# Patient Record
Sex: MALE | Race: WHITE | NOT HISPANIC OR LATINO | Employment: FULL TIME | URBAN - NONMETROPOLITAN AREA
[De-identification: names, ages, dates, MRNs, and addresses within clinical notes are randomized per-mention and may not be internally consistent; named-entity substitution may affect disease eponyms.]

---

## 2019-05-15 ENCOUNTER — OFFICE VISIT (OUTPATIENT)
Dept: CARDIOLOGY | Facility: CLINIC | Age: 59
End: 2019-05-15

## 2019-05-15 VITALS
SYSTOLIC BLOOD PRESSURE: 123 MMHG | OXYGEN SATURATION: 98 % | WEIGHT: 204 LBS | DIASTOLIC BLOOD PRESSURE: 71 MMHG | HEIGHT: 71 IN | BODY MASS INDEX: 28.56 KG/M2 | RESPIRATION RATE: 16 BRPM | HEART RATE: 86 BPM

## 2019-05-15 DIAGNOSIS — R06.02 SHORTNESS OF BREATH: Primary | ICD-10-CM

## 2019-05-15 DIAGNOSIS — R07.9 CHEST PAIN, UNSPECIFIED TYPE: ICD-10-CM

## 2019-05-15 DIAGNOSIS — I65.29 STENOSIS OF CAROTID ARTERY, UNSPECIFIED LATERALITY: ICD-10-CM

## 2019-05-15 DIAGNOSIS — R09.89 BRUIT OF RIGHT CAROTID ARTERY: ICD-10-CM

## 2019-05-15 PROCEDURE — 93000 ELECTROCARDIOGRAM COMPLETE: CPT | Performed by: PHYSICIAN ASSISTANT

## 2019-05-15 PROCEDURE — 99204 OFFICE O/P NEW MOD 45 MIN: CPT | Performed by: PHYSICIAN ASSISTANT

## 2019-05-15 RX ORDER — LOSARTAN POTASSIUM 100 MG/1
100 TABLET ORAL DAILY
COMMUNITY

## 2019-05-15 RX ORDER — ATORVASTATIN CALCIUM 40 MG/1
40 TABLET, FILM COATED ORAL NIGHTLY
Qty: 30 TABLET | Refills: 6 | Status: SHIPPED | OUTPATIENT
Start: 2019-05-15 | End: 2021-02-26 | Stop reason: ALTCHOICE

## 2019-05-15 RX ORDER — ASPIRIN 81 MG/1
81 TABLET ORAL DAILY
COMMUNITY

## 2019-05-15 RX ORDER — PRASUGREL 10 MG/1
10 TABLET, FILM COATED ORAL DAILY
COMMUNITY
End: 2019-05-15

## 2019-05-15 RX ORDER — AMITRIPTYLINE HYDROCHLORIDE 25 MG/1
25 TABLET, FILM COATED ORAL NIGHTLY
COMMUNITY

## 2019-05-15 RX ORDER — CARVEDILOL 25 MG/1
25 TABLET ORAL 2 TIMES DAILY WITH MEALS
COMMUNITY
End: 2021-08-27 | Stop reason: SDUPTHER

## 2019-05-15 RX ORDER — GLIMEPIRIDE 4 MG/1
4 TABLET ORAL 2 TIMES DAILY
COMMUNITY

## 2019-05-15 RX ORDER — ATORVASTATIN CALCIUM 80 MG/1
80 TABLET, FILM COATED ORAL NIGHTLY
COMMUNITY
End: 2019-05-15 | Stop reason: SDUPTHER

## 2019-05-15 NOTE — PATIENT INSTRUCTIONS
Steps to Quit Smoking  Smoking tobacco can be harmful to your health and can affect almost every organ in your body. Smoking puts you, and those around you, at risk for developing many serious chronic diseases. Quitting smoking is difficult, but it is one of the best things that you can do for your health. It is never too late to quit.  What are the benefits of quitting smoking?  When you quit smoking, you lower your risk of developing serious diseases and conditions, such as:  · Lung cancer or lung disease, such as COPD.  · Heart disease.  · Stroke.  · Heart attack.  · Infertility.  · Osteoporosis and bone fractures.    Additionally, symptoms such as coughing, wheezing, and shortness of breath may get better when you quit. You may also find that you get sick less often because your body is stronger at fighting off colds and infections. If you are pregnant, quitting smoking can help to reduce your chances of having a baby of low birth weight.  How do I get ready to quit?  When you decide to quit smoking, create a plan to make sure that you are successful. Before you quit:  · Pick a date to quit. Set a date within the next two weeks to give you time to prepare.  · Write down the reasons why you are quitting. Keep this list in places where you will see it often, such as on your bathroom mirror or in your car or wallet.  · Identify the people, places, things, and activities that make you want to smoke (triggers) and avoid them. Make sure to take these actions:  ? Throw away all cigarettes at home, at work, and in your car.  ? Throw away smoking accessories, such as ashtrays and lighters.  ? Clean your car and make sure to empty the ashtray.  ? Clean your home, including curtains and carpets.  · Tell your family, friends, and coworkers that you are quitting. Support from your loved ones can make quitting easier.  · Talk with your health care provider about your options for quitting smoking.  · Find out what treatment  options are covered by your health insurance.    What strategies can I use to quit smoking?  Talk with your healthcare provider about different strategies to quit smoking. Some strategies include:  · Quitting smoking altogether instead of gradually lessening how much you smoke over a period of time. Research shows that quitting “cold turkey” is more successful than gradually quitting.  · Attending in-person counseling to help you build problem-solving skills. You are more likely to have success in quitting if you attend several counseling sessions. Even short sessions of 10 minutes can be effective.  · Finding resources and support systems that can help you to quit smoking and remain smoke-free after you quit. These resources are most helpful when you use them often. They can include:  ? Online chats with a counselor.  ? Telephone quitlines.  ? Printed self-help materials.  ? Support groups or group counseling.  ? Text messaging programs.  ? Mobile phone applications.  · Taking medicines to help you quit smoking. (If you are pregnant or breastfeeding, talk with your health care provider first.) Some medicines contain nicotine and some do not. Both types of medicines help with cravings, but the medicines that include nicotine help to relieve withdrawal symptoms. Your health care provider may recommend:  ? Nicotine patches, gum, or lozenges.  ? Nicotine inhalers or sprays.  ? Non-nicotine medicine that is taken by mouth.    Talk with your health care provider about combining strategies, such as taking medicines while you are also receiving in-person counseling. Using these two strategies together makes you more likely to succeed in quitting than if you used either strategy on its own.  If you are pregnant or breastfeeding, talk with your health care provider about finding counseling or other support strategies to quit smoking. Do not take medicine to help you quit smoking unless told to do so by your health care  provider.  What things can I do to make it easier to quit?  Quitting smoking might feel overwhelming at first, but there is a lot that you can do to make it easier. Take these important actions:  · Reach out to your family and friends and ask that they support and encourage you during this time. Call telephone quitlines, reach out to support groups, or work with a counselor for support.  · Ask people who smoke to avoid smoking around you.  · Avoid places that trigger you to smoke, such as bars, parties, or smoke-break areas at work.  · Spend time around people who do not smoke.  · Lessen stress in your life, because stress can be a smoking trigger for some people. To lessen stress, try:  ? Exercising regularly.  ? Deep-breathing exercises.  ? Yoga.  ? Meditating.  ? Performing a body scan. This involves closing your eyes, scanning your body from head to toe, and noticing which parts of your body are particularly tense. Purposefully relax the muscles in those areas.  · Download or purchase mobile phone or tablet apps (applications) that can help you stick to your quit plan by providing reminders, tips, and encouragement. There are many free apps, such as QuitGuide from the CDC (Centers for Disease Control and Prevention). You can find other support for quitting smoking (smoking cessation) through smokefree.gov and other websites.    How will I feel when I quit smoking?  Within the first 24 hours of quitting smoking, you may start to feel some withdrawal symptoms. These symptoms are usually most noticeable 2-3 days after quitting, but they usually do not last beyond 2-3 weeks. Changes or symptoms that you might experience include:  · Mood swings.  · Restlessness, anxiety, or irritation.  · Difficulty concentrating.  · Dizziness.  · Strong cravings for sugary foods in addition to nicotine.  · Mild weight gain.  · Constipation.  · Nausea.  · Coughing or a sore throat.  · Changes in how your medicines work in your  body.  · A depressed mood.  · Difficulty sleeping (insomnia).    After the first 2-3 weeks of quitting, you may start to notice more positive results, such as:  · Improved sense of smell and taste.  · Decreased coughing and sore throat.  · Slower heart rate.  · Lower blood pressure.  · Clearer skin.  · The ability to breathe more easily.  · Fewer sick days.    Quitting smoking is very challenging for most people. Do not get discouraged if you are not successful the first time. Some people need to make many attempts to quit before they achieve long-term success. Do your best to stick to your quit plan, and talk with your health care provider if you have any questions or concerns.  This information is not intended to replace advice given to you by your health care provider. Make sure you discuss any questions you have with your health care provider.  Document Released: 12/12/2002 Document Revised: 07/24/2018 Document Reviewed: 05/03/2016  [employer] Interactive Patient Education © 2019 [employer]  Fat and Cholesterol Restricted Eating Plan  Getting too much fat and cholesterol in your diet may cause health problems. Choosing the right foods helps keep your fat and cholesterol at normal levels. This can keep you from getting certain diseases.  Your doctor may recommend an eating plan that includes:  · Total fat: ______% or less of total calories a day.  · Saturated fat: ______% or less of total calories a day.  · Cholesterol: less than _________mg a day.  · Fiber: ______g a day.    What are tips for following this plan?  General tips  · Work with your doctor to lose weight if you need to.  · Avoid:  ? Foods with added sugar.  ? Fried foods.  ? Foods with partially hydrogenated oils.  · Limit alcohol intake to no more than 1 drink a day for nonpregnant women and 2 drinks a day for men. One drink equals 12 oz of beer, 5 oz of wine, or 1½ oz of hard liquor.  Reading food labels  · Check food labels for:  ? Trans  "fats.  ? Partially hydrogenated oils.  ? Saturated fat (g) in each serving.  ? Cholesterol (mg) in each serving.  ? Fiber (g) in each serving.  · Choose foods with healthy fats, such as:  ? Monounsaturated fats.  ? Polyunsaturated fats.  ? Omega-3 fats.  · Choose grain products that have whole grains. Look for the word \"whole\" as the first word in the ingredient list.  Cooking  · Cook foods using low-fat methods. These include baking, boiling, grilling, and broiling.  · Eat more home-cooked foods. Eat at restaurants and buffets less often.  · Avoid cooking using saturated fats, such as butter, cream, palm oil, palm kernel oil, and coconut oil.  Meal planning  · At meals, divide your plate into four equal parts:  ? Fill one-half of your plate with vegetables and green salads.  ? Fill one-fourth of your plate with whole grains.  ? Fill one-fourth of your plate with low-fat (lean) protein foods.  · Eat fish that is high in omega-3 fats at least two times a week. This includes mackerel, tuna, sardines, and salmon.  · Eat foods that are high in fiber, such as whole grains, beans, apples, broccoli, carrots, peas, and barley.  Recommended foods  Grains  · Whole grains, such as whole wheat or whole grain breads, crackers, cereals, and pasta. Unsweetened oatmeal, bulgur, barley, quinoa, or brown rice. Corn or whole wheat flour tortillas.  Vegetables  · Fresh or frozen vegetables (raw, steamed, roasted, or grilled). Green salads.  Fruits  · All fresh, canned (in natural juice), or frozen fruits.  Meats and other protein foods  · Ground beef (85% or leaner), grass-fed beef, or beef trimmed of fat. Skinless chicken or turkey. Ground chicken or turkey. Pork trimmed of fat. All fish and seafood. Egg whites. Dried beans, peas, or lentils. Unsalted nuts or seeds. Unsalted canned beans. Nut butters without added sugar or oil.  Dairy  · Low-fat or nonfat dairy products, such as skim or 1% milk, 2% or reduced-fat cheeses, low-fat and " fat-free ricotta or cottage cheese, or plain low-fat and nonfat yogurt.  Fats and oils  · Tub margarine without trans fats. Light or reduced-fat mayonnaise and salad dressings. Avocado. Olive, canola, sesame, or safflower oils.  The items listed above may not be a complete list of recommended foods or beverages. Contact your dietitian for more options.  Foods to avoid  Grains  · White bread. White pasta. White rice. Cornbread. Bagels, pastries, and croissants. Crackers and snack foods that contain trans fat and hydrogenated oils.  Vegetables  · Vegetables cooked in cheese, cream, or butter sauce. Fried vegetables.  Fruits  · Canned fruit in heavy syrup. Fruit in cream or butter sauce. Fried fruit.  Meats and other protein foods  · Fatty cuts of meat. Ribs, chicken wings, catalan, sausage, bologna, salami, chitterlings, fatback, hot dogs, bratwurst, and packaged lunch meats. Liver and organ meats. Whole eggs and egg yolks. Chicken and turkey with skin. Fried meat.  Dairy  · Whole or 2% milk, cream, half-and-half, and cream cheese. Whole milk cheeses. Whole-fat or sweetened yogurt. Full-fat cheeses. Nondairy creamers and whipped toppings. Processed cheese, cheese spreads, and cheese curds.  Beverages  · Alcohol. Sugar-sweetened drinks such as sodas, lemonade, and fruit drinks.  Fats and oils  · Butter, stick margarine, lard, shortening, ghee, or catalan fat. Coconut, palm kernel, and palm oils.  Sweets and desserts  · Corn syrup, sugars, honey, and molasses. Candy. Jam and jelly. Syrup. Sweetened cereals. Cookies, pies, cakes, donuts, muffins, and ice cream.  The items listed above may not be a complete list of foods and beverages to avoid. Contact your dietitian for more information.  Summary  · Choosing the right foods helps keep your fat and cholesterol at normal levels. This can keep you from getting certain diseases.  · At meals, fill one-half of your plate with vegetables and green salads.  · Eat high-fiber foods,  like whole grains, beans, apples, carrots, peas, and barley.  · Limit added sugar, saturated fats, alcohol, and fried foods.  This information is not intended to replace advice given to you by your health care provider. Make sure you discuss any questions you have with your health care provider.  Document Released: 06/18/2013 Document Revised: 09/04/2018 Document Reviewed: 09/04/2018  [employer] Interactive Patient Education © 2019 [employer]

## 2019-05-15 NOTE — PROGRESS NOTES
Subjective   Floyd Villarreal is a 59 y.o. male     Chief Complaint   Patient presents with   • Coronary Artery Disease     Establish cardiac care   • Hypertension   • Hyperlipidemia       HPI    Problem list  1.  Coronary artery disease  1.1 cardiac catheterization February 2018 at hospital in [facility] with 100% occlusion of the proximal RCA status post thrombectomy and stenting x3 nonobstructive LAD and circumflex  2.  Preserved systolic function  3.  Hypertension  4.  Dyslipidemia  5.  Diabetes mellitus    Patient is a 59-year-old male that presents today to establish care.  Patient describes that he has been doing well.  He has chronic shoulder discomfort from arthritis and feels pain in his shoulder radiating to his chest but this is not like what he felt with previous stenting.  Patient describes doing well and has no other chest pain.  He has mild levels of stable exertional dyspnea and usually only experience with extended levels of activity.  No PND orthopnea.  Patient does not palpitated of dysrhythmic symptoms.  Patient is doing well otherwise      Current Outpatient Medications   Medication Sig Dispense Refill   • amitriptyline (ELAVIL) 25 MG tablet Take 25 mg by mouth Every Night.     • aspirin 81 MG EC tablet Take 81 mg by mouth Daily.     • atorvastatin (LIPITOR) 40 MG tablet Take 1 tablet by mouth Every Night. 30 tablet 6   • carvedilol (COREG) 25 MG tablet Take 25 mg by mouth 2 (Two) Times a Day With Meals.     • glimepiride (AMARYL) 4 MG tablet Take 4 mg by mouth 2 (Two) Times a Day.     • Liraglutide (VICTOZA SC) Inject 1.8 mg under the skin into the appropriate area as directed Daily.     • losartan (COZAAR) 50 MG tablet Take 100 mg by mouth Daily.     • metFORMIN (GLUCOPHAGE) 500 MG tablet Take 500 mg by mouth 2 (Two) Times a Day With Meals.       No current facility-administered medications for this visit.        Patient has no known allergies.    Past Medical History:   Diagnosis  Date   • Coronary arteriosclerosis after percutaneous transluminal coronary angioplasty (PTCA)    • Coronary arteriosclerosis after percutaneous transluminal coronary angioplasty (PTCA)    • Coronary artery disease    • Diabetes mellitus (CMS/HCC)    • Hyperlipidemia    • Hypertension    • Myocardial infarction (CMS/HCC)    • Stroke (CMS/HCC)        Social History     Socioeconomic History   • Marital status:      Spouse name: Not on file   • Number of children: Not on file   • Years of education: Not on file   • Highest education level: Not on file   Tobacco Use   • Smoking status: Current Every Day Smoker     Packs/day: 1.50     Types: Cigarettes   • Smokeless tobacco: Never Used   Substance and Sexual Activity   • Alcohol use: No     Frequency: Never   • Drug use: No           Family History   Problem Relation Age of Onset   • Hypertension Mother    • Lung cancer Mother    • Hypertension Father    • Cancer Father        Review of Systems   Constitutional: Negative for activity change, appetite change and fatigue.   HENT: Negative for facial swelling, sore throat, tinnitus, trouble swallowing and voice change.    Eyes: Positive for visual disturbance. Negative for photophobia.   Respiratory: Positive for shortness of breath. Negative for chest tightness and wheezing.    Cardiovascular: Negative for chest pain, palpitations and leg swelling.   Gastrointestinal: Negative for abdominal pain, blood in stool, constipation and diarrhea.   Endocrine: Negative for cold intolerance, heat intolerance and polyuria.   Genitourinary: Negative for difficulty urinating, dysuria, flank pain and hematuria.   Musculoskeletal: Positive for joint swelling and myalgias. Negative for arthralgias.   Skin: Negative for color change and rash.   Allergic/Immunologic: Negative for environmental allergies and food allergies.   Neurological: Negative for dizziness, syncope and light-headedness.   Hematological: Bruises/bleeds easily.  "  Psychiatric/Behavioral: Negative for agitation, sleep disturbance and suicidal ideas. The patient is not nervous/anxious.    All other systems reviewed and are negative.      Objective   Vitals:    05/15/19 1101   BP: 123/71   BP Location: Left arm   Patient Position: Sitting   Pulse: 86   Resp: 16   SpO2: 98%   Weight: 92.5 kg (204 lb)   Height: 180.3 cm (71\")      /71 (BP Location: Left arm, Patient Position: Sitting)   Pulse 86   Resp 16   Ht 180.3 cm (71\")   Wt 92.5 kg (204 lb)   SpO2 98%   BMI 28.45 kg/m²     Lab Results (most recent)     None          Physical Exam   Constitutional: He is oriented to person, place, and time. He appears well-developed and well-nourished. No distress.   HENT:   Head: Normocephalic and atraumatic.   Eyes: EOM are normal. Pupils are equal, round, and reactive to light.   Neck: No JVD present. Carotid bruit is present.   Cardiovascular: Normal rate, regular rhythm, normal heart sounds and intact distal pulses. Exam reveals no gallop and no friction rub.   No murmur heard.  Pulmonary/Chest: Effort normal and breath sounds normal. No respiratory distress. He has no wheezes. He has no rales. He exhibits no tenderness.   Musculoskeletal: Normal range of motion. He exhibits no edema.   Neurological: He is alert and oriented to person, place, and time. No cranial nerve deficit.   Skin: Skin is warm and dry. No rash noted. No erythema. No pallor.   Psychiatric: He has a normal mood and affect. His behavior is normal.   Nursing note and vitals reviewed.      Procedure   Procedures         Assessment/Plan     Problems Addressed this Visit        Cardiovascular and Mediastinum    Bruit of right carotid artery    Relevant Orders    Duplex Carotid Ultrasound CAR    Stenosis of carotid artery       Respiratory    Shortness of breath - Primary       Nervous and Auditory    Chest pain            Recommendation  1.  Patient doing well at this time.  Chest pain is atypical and not " like what he felt previous to stenting.  He is feeling remarkably well otherwise dyspnea is mild.  2.  Patient with carotid bruit noted on examination.  We will schedule for carotid ultrasound.  3.  Patient describes significant myalgias.  Patient recent lipids show LDL in the 40s.  I would like to decrease atorvastatin to 40 mg.  Hemoglobin A1c greater than 11.  4.  We will see patient back for follow-up after testing.  He will follow-up with primary as scheduled           Patient's Body mass index is 28.45 kg/m². BMI is within normal parameters. No follow-up required..         Electronically signed by:

## 2019-06-04 ENCOUNTER — HOSPITAL ENCOUNTER (OUTPATIENT)
Dept: CARDIOLOGY | Facility: HOSPITAL | Age: 59
Discharge: HOME | End: 2019-06-04
Admitting: PHYSICIAN ASSISTANT

## 2019-06-04 DIAGNOSIS — R09.89 BRUIT OF RIGHT CAROTID ARTERY: ICD-10-CM

## 2019-06-04 PROCEDURE — 93880 EXTRACRANIAL BILAT STUDY: CPT

## 2019-06-04 PROCEDURE — 93880 EXTRACRANIAL BILAT STUDY: CPT | Performed by: INTERNAL MEDICINE

## 2019-06-10 LAB
BH CV ECHO MEAS - BSA(HAYCOCK): 2.2 M^2
BH CV ECHO MEAS - BSA: 2.1 M^2
BH CV ECHO MEAS - BZI_BMI: 28.5 KILOGRAMS/M^2
BH CV ECHO MEAS - BZI_METRIC_HEIGHT: 180.3 CM
BH CV ECHO MEAS - BZI_METRIC_WEIGHT: 92.5 KG
BH CV XLRA MEAS LEFT BULB EDV: 24.7 CM/SEC
BH CV XLRA MEAS LEFT BULB PSV: 101 CM/SEC
BH CV XLRA MEAS LEFT CCA RATIO VEL: 88.7 CM/SEC
BH CV XLRA MEAS LEFT DIST CCA EDV: 21.1 CM/SEC
BH CV XLRA MEAS LEFT DIST CCA PSV: 88.7 CM/SEC
BH CV XLRA MEAS LEFT DIST ICA EDV: -9.9 CM/SEC
BH CV XLRA MEAS LEFT DIST ICA PSV: -100 CM/SEC
BH CV XLRA MEAS LEFT ICA RATIO VEL: -117 CM/SEC
BH CV XLRA MEAS LEFT ICA/CCA RATIO: -1.3
BH CV XLRA MEAS LEFT MID CCA EDV: 24.9 CM/SEC
BH CV XLRA MEAS LEFT MID CCA PSV: 119 CM/SEC
BH CV XLRA MEAS LEFT MID ICA EDV: -32.8 CM/SEC
BH CV XLRA MEAS LEFT MID ICA PSV: -117 CM/SEC
BH CV XLRA MEAS LEFT PROX CCA EDV: 27.5 CM/SEC
BH CV XLRA MEAS LEFT PROX CCA PSV: 121 CM/SEC
BH CV XLRA MEAS LEFT PROX ECA EDV: -19.9 CM/SEC
BH CV XLRA MEAS LEFT PROX ECA PSV: -150 CM/SEC
BH CV XLRA MEAS LEFT PROX ICA EDV: -32.8 CM/SEC
BH CV XLRA MEAS LEFT PROX ICA PSV: -115 CM/SEC
BH CV XLRA MEAS LEFT VERTEBRAL A EDV: 24.4 CM/SEC
BH CV XLRA MEAS LEFT VERTEBRAL A PSV: 64.2 CM/SEC
BH CV XLRA MEAS RIGHT BULB EDV: 28.9 CM/SEC
BH CV XLRA MEAS RIGHT BULB PSV: 94.2 CM/SEC
BH CV XLRA MEAS RIGHT CCA RATIO VEL: 77.7 CM/SEC
BH CV XLRA MEAS RIGHT DIST CCA EDV: 22.7 CM/SEC
BH CV XLRA MEAS RIGHT DIST CCA PSV: 77.7 CM/SEC
BH CV XLRA MEAS RIGHT DIST ICA EDV: -38.7 CM/SEC
BH CV XLRA MEAS RIGHT DIST ICA PSV: -98.8 CM/SEC
BH CV XLRA MEAS RIGHT ICA RATIO VEL: -140 CM/SEC
BH CV XLRA MEAS RIGHT ICA/CCA RATIO: -1.8
BH CV XLRA MEAS RIGHT MID CCA EDV: 21.3 CM/SEC
BH CV XLRA MEAS RIGHT MID CCA PSV: 84.6 CM/SEC
BH CV XLRA MEAS RIGHT MID ICA EDV: -34.4 CM/SEC
BH CV XLRA MEAS RIGHT MID ICA PSV: -140 CM/SEC
BH CV XLRA MEAS RIGHT PROX CCA EDV: 20.6 CM/SEC
BH CV XLRA MEAS RIGHT PROX CCA PSV: 90.8 CM/SEC
BH CV XLRA MEAS RIGHT PROX ECA EDV: -23.2 CM/SEC
BH CV XLRA MEAS RIGHT PROX ECA PSV: -146 CM/SEC
BH CV XLRA MEAS RIGHT PROX ICA EDV: -30.9 CM/SEC
BH CV XLRA MEAS RIGHT PROX ICA PSV: -107 CM/SEC
BH CV XLRA MEAS RIGHT VERTEBRAL A EDV: 22.3 CM/SEC
BH CV XLRA MEAS RIGHT VERTEBRAL A PSV: 76.5 CM/SEC

## 2019-06-11 ENCOUNTER — TELEPHONE (OUTPATIENT)
Dept: CARDIOLOGY | Facility: CLINIC | Age: 59
End: 2019-06-11

## 2019-06-11 NOTE — TELEPHONE ENCOUNTER
Called patient and notified him of carotid results. notified to keep follow up as scheduled, any issues before then call our office. Patient verbalized understanding and had no further questions at this time. -;[facility]     Result Notes for Duplex Carotid Ultrasound CAR     Notes recorded by Tony Stevenson PA on 6/11/2019 at 11:33 AM EDT  Routine follow up   Duplex Carotid Ultrasound CAR   Order: [id number]   Status:  Final result   Visible to patient:  No (Not Released) Dx:  Bruit of right carotid artery   Details     Reading Physician Reading Date Result Priority   Gibson Lopez MD 6/4/2019 Routine      Result Text     1.  Both common carotid arteries are widely patent.     2.  Mild to moderate bifurcation disease bilaterally, calcified on the left, with less than 50% stenosis by Doppler sampling.     3.  16 to 49% stenosis in both internal carotid arteries.     4.  Antegrade flow in both vertebral arteries     Summary: Nonobstructive carotid disease as described above with antegrade flow in both vertebral arteries.

## 2019-06-27 ENCOUNTER — OFFICE VISIT (OUTPATIENT)
Dept: CARDIOLOGY | Facility: CLINIC | Age: 59
End: 2019-06-27

## 2019-06-27 VITALS
DIASTOLIC BLOOD PRESSURE: 83 MMHG | HEART RATE: 102 BPM | HEIGHT: 71 IN | OXYGEN SATURATION: 97 % | WEIGHT: 210.8 LBS | SYSTOLIC BLOOD PRESSURE: 142 MMHG | BODY MASS INDEX: 29.51 KG/M2

## 2019-06-27 DIAGNOSIS — I25.10 CORONARY ARTERY DISEASE INVOLVING NATIVE CORONARY ARTERY OF NATIVE HEART WITHOUT ANGINA PECTORIS: Primary | ICD-10-CM

## 2019-06-27 DIAGNOSIS — I10 ESSENTIAL HYPERTENSION: ICD-10-CM

## 2019-06-27 DIAGNOSIS — R06.02 SHORTNESS OF BREATH: ICD-10-CM

## 2019-06-27 PROCEDURE — 99213 OFFICE O/P EST LOW 20 MIN: CPT | Performed by: PHYSICIAN ASSISTANT

## 2019-06-27 RX ORDER — NICOTINE 21 MG/24HR
1 PATCH, TRANSDERMAL 24 HOURS TRANSDERMAL EVERY 24 HOURS
COMMUNITY
End: 2021-02-26

## 2019-06-27 RX ORDER — PRASUGREL 10 MG/1
10 TABLET, FILM COATED ORAL DAILY
COMMUNITY
End: 2020-05-20 | Stop reason: SDUPTHER

## 2019-06-27 RX ORDER — FENOFIBRATE 48 MG/1
48 TABLET, COATED ORAL DAILY
COMMUNITY
End: 2021-02-26

## 2019-06-27 NOTE — PATIENT INSTRUCTIONS
"Fat and Cholesterol Restricted Eating Plan  Getting too much fat and cholesterol in your diet may cause health problems. Choosing the right foods helps keep your fat and cholesterol at normal levels. This can keep you from getting certain diseases.  Your doctor may recommend an eating plan that includes:  · Total fat: ______% or less of total calories a day.  · Saturated fat: ______% or less of total calories a day.  · Cholesterol: less than _________mg a day.  · Fiber: ______g a day.    What are tips for following this plan?  General tips  · Work with your doctor to lose weight if you need to.  · Avoid:  ? Foods with added sugar.  ? Fried foods.  ? Foods with partially hydrogenated oils.  · Limit alcohol intake to no more than 1 drink a day for nonpregnant women and 2 drinks a day for men. One drink equals 12 oz of beer, 5 oz of wine, or 1½ oz of hard liquor.  Reading food labels  · Check food labels for:  ? Trans fats.  ? Partially hydrogenated oils.  ? Saturated fat (g) in each serving.  ? Cholesterol (mg) in each serving.  ? Fiber (g) in each serving.  · Choose foods with healthy fats, such as:  ? Monounsaturated fats.  ? Polyunsaturated fats.  ? Omega-3 fats.  · Choose grain products that have whole grains. Look for the word \"whole\" as the first word in the ingredient list.  Cooking  · Cook foods using low-fat methods. These include baking, boiling, grilling, and broiling.  · Eat more home-cooked foods. Eat at restaurants and buffets less often.  · Avoid cooking using saturated fats, such as butter, cream, palm oil, palm kernel oil, and coconut oil.  Meal planning  · At meals, divide your plate into four equal parts:  ? Fill one-half of your plate with vegetables and green salads.  ? Fill one-fourth of your plate with whole grains.  ? Fill one-fourth of your plate with low-fat (lean) protein foods.  · Eat fish that is high in omega-3 fats at least two times a week. This includes mackerel, tuna, sardines, and " salmon.  · Eat foods that are high in fiber, such as whole grains, beans, apples, broccoli, carrots, peas, and barley.  Recommended foods  Grains  · Whole grains, such as whole wheat or whole grain breads, crackers, cereals, and pasta. Unsweetened oatmeal, bulgur, barley, quinoa, or brown rice. Corn or whole wheat flour tortillas.  Vegetables  · Fresh or frozen vegetables (raw, steamed, roasted, or grilled). Green salads.  Fruits  · All fresh, canned (in natural juice), or frozen fruits.  Meats and other protein foods  · Ground beef (85% or leaner), grass-fed beef, or beef trimmed of fat. Skinless chicken or turkey. Ground chicken or turkey. Pork trimmed of fat. All fish and seafood. Egg whites. Dried beans, peas, or lentils. Unsalted nuts or seeds. Unsalted canned beans. Nut butters without added sugar or oil.  Dairy  · Low-fat or nonfat dairy products, such as skim or 1% milk, 2% or reduced-fat cheeses, low-fat and fat-free ricotta or cottage cheese, or plain low-fat and nonfat yogurt.  Fats and oils  · Tub margarine without trans fats. Light or reduced-fat mayonnaise and salad dressings. Avocado. Olive, canola, sesame, or safflower oils.  The items listed above may not be a complete list of recommended foods or beverages. Contact your dietitian for more options.  Foods to avoid  Grains  · White bread. White pasta. White rice. Cornbread. Bagels, pastries, and croissants. Crackers and snack foods that contain trans fat and hydrogenated oils.  Vegetables  · Vegetables cooked in cheese, cream, or butter sauce. Fried vegetables.  Fruits  · Canned fruit in heavy syrup. Fruit in cream or butter sauce. Fried fruit.  Meats and other protein foods  · Fatty cuts of meat. Ribs, chicken wings, catalan, sausage, bologna, salami, chitterlings, fatback, hot dogs, bratwurst, and packaged lunch meats. Liver and organ meats. Whole eggs and egg yolks. Chicken and turkey with skin. Fried meat.  Dairy  · Whole or 2% milk, cream,  half-and-half, and cream cheese. Whole milk cheeses. Whole-fat or sweetened yogurt. Full-fat cheeses. Nondairy creamers and whipped toppings. Processed cheese, cheese spreads, and cheese curds.  Beverages  · Alcohol. Sugar-sweetened drinks such as sodas, lemonade, and fruit drinks.  Fats and oils  · Butter, stick margarine, lard, shortening, ghee, or catalan fat. Coconut, palm kernel, and palm oils.  Sweets and desserts  · Corn syrup, sugars, honey, and molasses. Candy. Jam and jelly. Syrup. Sweetened cereals. Cookies, pies, cakes, donuts, muffins, and ice cream.  The items listed above may not be a complete list of foods and beverages to avoid. Contact your dietitian for more information.  Summary  · Choosing the right foods helps keep your fat and cholesterol at normal levels. This can keep you from getting certain diseases.  · At meals, fill one-half of your plate with vegetables and green salads.  · Eat high-fiber foods, like whole grains, beans, apples, carrots, peas, and barley.  · Limit added sugar, saturated fats, alcohol, and fried foods.  This information is not intended to replace advice given to you by your health care provider. Make sure you discuss any questions you have with your health care provider.  Document Released: 06/18/2013 Document Revised: 09/04/2018 Document Reviewed: 09/04/2018  [employer] Interactive Patient Education © 2019 [employer]  BMI for Adults  Body mass index (BMI) is a number that is calculated from a person's weight and height. In most adults, the number is used to find how much of an adult's weight is made up of fat. BMI is not as accurate as a direct measure of body fat.  How is BMI calculated?  BMI is calculated by dividing weight in kilograms by height in meters squared. It can also be calculated by dividing weight in pounds by height in inches squared, then multiplying the resulting number by 703. Charts are available to help you find your BMI quickly and easily without  doing this calculation.  How is BMI interpreted?  Health care professionals use BMI charts to identify whether an adult is underweight, at a normal weight, or overweight based on the following guidelines:  · Underweight: BMI less than 18.5.  · Normal weight: BMI between 18.5 and 24.9.  · Overweight: BMI between 25 and 29.9.  · Obese: BMI of 30 and above.    BMI is usually interpreted the same for males and females.  Weight includes both fat and muscle, so someone with a muscular build, such as an athlete, may have a BMI that is higher than 24.9. In cases like these, BMI may not accurately depict body fat. To determine if excess body fat is the cause of a BMI of 25 or higher, further assessments may need to be done by a health care provider.  Why is BMI a useful tool?  BMI is used to identify a possible weight problem that may be related to a medical problem or may increase the risk for medical problems. BMI can also be used to promote changes to reach a healthy weight.  This information is not intended to replace advice given to you by your health care provider. Make sure you discuss any questions you have with your health care provider.  Document Released: 08/29/2005 Document Revised: 04/27/2017 Document Reviewed: 05/15/2015  [employer] Interactive Patient Education © 2018 [employer]

## 2019-06-27 NOTE — PROGRESS NOTES
"Problem list     Subjective   Floyd Villarreal is a 59 y.o. male     Chief Complaint   Patient presents with   • Chest Pain   • Shortness of Breath       HPI      Problem list  1.  Coronary artery disease  1.1 cardiac catheterization February 2018 at hospital in [facility] with 100% occlusion of the proximal RCA status post thrombectomy and stenting x3 nonobstructive LAD and circumflex  1.2 cardiac catheterization June 2019 because of NSTEMI demonstrated high-grade circumflex disease status post stenting of the native circumflex and OM1 with nonobstructive disease otherwise medical management recommended  2.  Preserved systolic function  3.  Hypertension  4.  Dyslipidemia  5.  Diabetes mellitus      Patient is a 59-year-old male who presents back for follow-up.  Patient describes that he had been doing remarkably well until one day he started noticing discomfort in his chest but it eventually resolved.  Patient was driving a truck at the time.  He described the next couple of days he began to notice worsening pain and eventually he got back home.  He describes waking up the next day and then started getting tightness.  He went over to his brother's house and he started developing neck and arm discomfort.  He went to the hospital and cardiac enzymes were elevated.  He describes that his troponin increased to \"9\" and he had catheterization on the same day by cardiology on-call who performed standing.    Since that time, he has felt remarkably well.  He does not describe any chest discomfort at this point.  He has very minimal shortness of breath.  He is actually doing remarkably well.  He can do activity without limitation.  No PND orthopnea.    He does not palpitated of dysrhythmic symptoms.    On review of patient's lipid parameters, LDL measured April 2019 was 47.  Patient has quit smoking which we applaud his efforts at.  Patient's hemoglobin A1c was approximately 1.1 recently.      Outpatient Encounter " Medications as of 6/27/2019   Medication Sig Dispense Refill   • amitriptyline (ELAVIL) 25 MG tablet Take 25 mg by mouth Every Night.     • aspirin 81 MG EC tablet Take 81 mg by mouth Daily.     • atorvastatin (LIPITOR) 40 MG tablet Take 1 tablet by mouth Every Night. (Patient taking differently: Take 80 mg by mouth Every Night.) 30 tablet 6   • carvedilol (COREG) 25 MG tablet Take 25 mg by mouth 2 (Two) Times a Day With Meals.     • Coenzyme Q10 400 MG capsule Take 400 mg by mouth Daily.     • fenofibrate (TRICOR) 48 MG tablet Take 48 mg by mouth Daily.     • glimepiride (AMARYL) 4 MG tablet Take 4 mg by mouth 2 (Two) Times a Day.     • Liraglutide (VICTOZA SC) Inject 1.8 mg under the skin into the appropriate area as directed Daily.     • losartan (COZAAR) 100 MG tablet Take 100 mg by mouth Daily.     • metFORMIN (GLUCOPHAGE) 500 MG tablet Take 1,000 mg by mouth 2 (Two) Times a Day With Meals.     • nicotine (NICODERM CQ) 21 MG/24HR patch Place 1 patch on the skin as directed by provider Daily.     • prasugrel (EFFIENT) 10 MG tablet Take 10 mg by mouth Daily.       No facility-administered encounter medications on file as of 6/27/2019.        Patient has no known allergies.    Past Medical History:   Diagnosis Date   • Coronary arteriosclerosis after percutaneous transluminal coronary angioplasty (PTCA)    • Coronary arteriosclerosis after percutaneous transluminal coronary angioplasty (PTCA)    • Coronary artery disease    • Diabetes mellitus (CMS/HCC)    • Hyperlipidemia    • Hypertension    • Myocardial infarction (CMS/HCC)     MI 6/2019    • Stroke (CMS/[facility], located [facility])        Social History     Socioeconomic History   • Marital status:      Spouse name: Not on file   • Number of children: Not on file   • Years of education: Not on file   • Highest education level: Not on file   Tobacco Use   • Smoking status: Former Smoker     Packs/day: 1.50     Types: Cigarettes     Last attempt to quit: 6/22/2019     Years  "since quittin.0   • Smokeless tobacco: Never Used   Substance and Sexual Activity   • Alcohol use: No     Frequency: Never   • Drug use: No       Family History   Problem Relation Age of Onset   • Hypertension Mother    • Lung cancer Mother    • Hypertension Father    • Cancer Father        Review of Systems   Constitutional: Positive for fatigue (some fatigue, but doing better ). Negative for chills and fever.   HENT: Negative.    Eyes: Positive for visual disturbance (Glasses daily ).   Respiratory: Positive for shortness of breath. Negative for chest tightness.    Cardiovascular: Positive for leg swelling (Occasional edema ). Negative for chest pain and palpitations.        Recently in [facility] with another MI. Had 4 stents put in by Dr. Vaughn.  Denies any chest pain since then.    Gastrointestinal: Negative.    Endocrine: Positive for cold intolerance (hands and feet stay cold ). Negative for heat intolerance.   Genitourinary: Negative.    Musculoskeletal: Positive for back pain (Lower back pain ). Negative for arthralgias.   Skin: Negative.  Negative for rash and wound.   Allergic/Immunologic: Negative.  Negative for environmental allergies and food allergies.   Neurological: Negative.    Hematological: Bruises/bleeds easily (Bleeds easily ).   Psychiatric/Behavioral: Negative.  Negative for sleep disturbance (Denies waking with smothering or SOA).   All other systems reviewed and are negative.      Objective   Vitals:    19 1420   BP: 142/83   BP Location: Left arm   Patient Position: Sitting   Pulse: 102   SpO2: 97%   Weight: 95.6 kg (210 lb 12.8 oz)   Height: 180.3 cm (71\")      /83 (BP Location: Left arm, Patient Position: Sitting)   Pulse 102   Ht 180.3 cm (71\")   Wt 95.6 kg (210 lb 12.8 oz)   SpO2 97%   BMI 29.40 kg/m²     Lab Results (most recent)     None          Physical Exam   Constitutional: He is oriented to person, place, and time. He appears well-developed and well-nourished. No " distress.   HENT:   Head: Normocephalic and atraumatic.   Eyes: EOM are normal. Pupils are equal, round, and reactive to light.   Neck: No JVD present.   Cardiovascular: Normal rate, regular rhythm, normal heart sounds and intact distal pulses. Exam reveals no gallop and no friction rub.   No murmur heard.  Pulmonary/Chest: Effort normal and breath sounds normal. No respiratory distress. He has no wheezes. He has no rales. He exhibits no tenderness.   Musculoskeletal: Normal range of motion. He exhibits no edema.   Neurological: He is alert and oriented to person, place, and time. No cranial nerve deficit.   Skin: Skin is warm and dry. No rash noted. No erythema. No pallor.   Psychiatric: He has a normal mood and affect. His behavior is normal.   Nursing note and vitals reviewed.      Procedure   Procedures       Assessment/Plan     Problems Addressed this Visit        Cardiovascular and Mediastinum    Coronary artery disease involving native coronary artery of native heart without angina pectoris - Primary    Relevant Medications    prasugrel (EFFIENT) 10 MG tablet    Essential hypertension       Respiratory    Shortness of breath              Recommendation  1.  Patient doing remarkably well after standing.  He has no chest pain or ischemic symptoms at this point.  He is on appropriate medications.  2.  We applied his efforts at smoking cessation.  I discussed with him that this was the best thing he could do to prevent recurrent events.  His cholesterol is well controlled..  Patient follows with primary regards to his diabetic status.  3.  For now, we will continue medical management see him back for follow-up in 4 months.  Will follow with primary as scheduled       Patient's Body mass index is 29.4 kg/m². BMI is above normal parameters. Recommendations include: educational material.       Electronically signed by:

## 2019-08-06 ENCOUNTER — TELEPHONE (OUTPATIENT)
Dept: CARDIOLOGY | Facility: CLINIC | Age: 59
End: 2019-08-06

## 2019-08-06 NOTE — TELEPHONE ENCOUNTER
Patient states he needs a release showing he is able to return to work 60 days following LHC with intervention on 6/23/19. Verbal order per Tony Stevenson PA-C approving clearance. Elizabeth Deal MA      ----- Message from Rylan Sapp sent at 8/5/2019 12:55 PM EDT -----  Contact: [phone]  PT SAID TONY TOLD HIM HE COULD STOP BY ANYTIME TO  A PAPER FOR DOT RELEASE. PT NEEDS BEFORE Saturday. PLEASE CALL PT.

## 2019-10-28 ENCOUNTER — OFFICE VISIT (OUTPATIENT)
Dept: CARDIOLOGY | Facility: CLINIC | Age: 59
End: 2019-10-28

## 2019-10-28 VITALS
WEIGHT: 208 LBS | HEIGHT: 71 IN | SYSTOLIC BLOOD PRESSURE: 145 MMHG | HEART RATE: 100 BPM | DIASTOLIC BLOOD PRESSURE: 84 MMHG | OXYGEN SATURATION: 99 % | BODY MASS INDEX: 29.12 KG/M2

## 2019-10-28 DIAGNOSIS — I25.10 CORONARY ARTERY DISEASE INVOLVING NATIVE CORONARY ARTERY OF NATIVE HEART WITHOUT ANGINA PECTORIS: ICD-10-CM

## 2019-10-28 DIAGNOSIS — R06.02 SHORTNESS OF BREATH: Primary | ICD-10-CM

## 2019-10-28 DIAGNOSIS — R07.9 CHEST PAIN, UNSPECIFIED TYPE: ICD-10-CM

## 2019-10-28 PROCEDURE — 99214 OFFICE O/P EST MOD 30 MIN: CPT | Performed by: PHYSICIAN ASSISTANT

## 2019-10-28 RX ORDER — ISOSORBIDE MONONITRATE 30 MG/1
30 TABLET, EXTENDED RELEASE ORAL EVERY MORNING
Qty: 30 TABLET | Refills: 11 | Status: SHIPPED | OUTPATIENT
Start: 2019-10-28

## 2019-10-28 NOTE — PROGRESS NOTES
Problem list     Subjective   Floyd Villarreal is a 59 y.o. male     Chief Complaint   Patient presents with   • Coronary Artery Disease     Here for a follow up    • Hypertension     Problem list  1.  Coronary artery disease  1.1 cardiac catheterization February 2018 at hospital in [facility] with 100% occlusion of the proximal RCA status post thrombectomy and stenting x3 nonobstructive LAD and circumflex  1.2 cardiac catheterization June 2019 because of NSTEMI demonstrated high-grade circumflex disease status post stenting of the native circumflex and OM1 with nonobstructive disease otherwise medical management recommended  2.  Preserved systolic function  3.  Hypertension  4.  Dyslipidemia  5.  Diabetes mellitus  HPI    Patient is a 59-year-old male who presents back to the office for follow-up.  He has felt remarkably well since his last office visit.  Occasionally he has felt a sharp pain and felt a pounding heartbeat but it was transient and resolved.  Nothing like what he felt previous to stenting.    He has no significant exertional dyspnea other than mild dyspnea if he tries to exert for high levels.  No PND orthopnea.    He does not palpitate or have dysrhythmic symptoms otherwise.  He is doing remarkably well      Current Outpatient Medications on File Prior to Visit   Medication Sig Dispense Refill   • amitriptyline (ELAVIL) 25 MG tablet Take 25 mg by mouth Every Night.     • aspirin 81 MG EC tablet Take 81 mg by mouth Daily.     • atorvastatin (LIPITOR) 40 MG tablet Take 1 tablet by mouth Every Night. (Patient taking differently: Take 80 mg by mouth Every Night.) 30 tablet 6   • carvedilol (COREG) 25 MG tablet Take 25 mg by mouth 2 (Two) Times a Day With Meals.     • Coenzyme Q10 400 MG capsule Take 400 mg by mouth Daily.     • Empagliflozin (JARDIANCE) 10 MG tablet Take 10 mg by mouth Daily.     • fenofibrate (TRICOR) 48 MG tablet Take 48 mg by mouth Daily.     • glimepiride (AMARYL) 4 MG  tablet Take 4 mg by mouth 2 (Two) Times a Day.     • Liraglutide (VICTOZA SC) Inject 1.8 mg under the skin into the appropriate area as directed Daily.     • losartan (COZAAR) 100 MG tablet Take 100 mg by mouth Daily.     • metFORMIN (GLUCOPHAGE) 500 MG tablet Take 1,000 mg by mouth 2 (Two) Times a Day With Meals.     • nicotine (NICODERM CQ) 21 MG/24HR patch Place 1 patch on the skin as directed by provider Daily.     • prasugrel (EFFIENT) 10 MG tablet Take 10 mg by mouth Daily.       No current facility-administered medications on file prior to visit.        Patient has no known allergies.    Past Medical History:   Diagnosis Date   • Coronary arteriosclerosis after percutaneous transluminal coronary angioplasty (PTCA)    • Coronary arteriosclerosis after percutaneous transluminal coronary angioplasty (PTCA)    • Coronary artery disease    • Diabetes mellitus (CMS/HCC)    • Hyperlipidemia    • Hypertension    • Myocardial infarction (CMS/HCC)     MI 2019    • Stroke (CMS/[facility])        Social History     Socioeconomic History   • Marital status:      Spouse name: Not on file   • Number of children: Not on file   • Years of education: Not on file   • Highest education level: Not on file   Tobacco Use   • Smoking status: Current Every Day Smoker     Packs/day: 0.25     Types: Cigarettes     Last attempt to quit: 2019     Years since quittin.3   • Smokeless tobacco: Never Used   Substance and Sexual Activity   • Alcohol use: No     Frequency: Never   • Drug use: No       Family History   Problem Relation Age of Onset   • Hypertension Mother    • Lung cancer Mother    • Hypertension Father    • Cancer Father        Review of Systems   Constitutional: Negative.  Negative for chills, fatigue and fever.   HENT: Positive for rhinorrhea.    Eyes: Positive for visual disturbance (glasses daily ).   Respiratory: Negative.  Negative for chest tightness and shortness of breath.    Cardiovascular: Positive for  "chest pain (fleeting pain ) and palpitations. Negative for leg swelling.   Gastrointestinal: Negative.    Endocrine: Positive for cold intolerance (hands and feet stays cold ). Negative for heat intolerance.   Genitourinary: Negative.    Musculoskeletal: Positive for arthralgias (left shoulder pain ). Negative for back pain.   Skin: Negative.  Negative for rash and wound.   Allergic/Immunologic: Negative.  Negative for environmental allergies and food allergies.   Neurological: Negative.    Hematological: Bruises/bleeds easily (bruises easily ).   Psychiatric/Behavioral: Negative.  Negative for sleep disturbance (denies waking with smothering ).   All other systems reviewed and are negative.      Objective   Vitals:    10/28/19 1032   BP: 145/84   BP Location: Left arm   Patient Position: Sitting   Pulse: 100   SpO2: 99%   Weight: 94.3 kg (208 lb)   Height: 180.3 cm (71\")      /84 (BP Location: Left arm, Patient Position: Sitting)   Pulse 100   Ht 180.3 cm (71\")   Wt 94.3 kg (208 lb)   SpO2 99%   BMI 29.01 kg/m²     Lab Results (most recent)     None          Physical Exam   Constitutional: He is oriented to person, place, and time. He appears well-developed and well-nourished. No distress.   HENT:   Head: Normocephalic and atraumatic.   Eyes: EOM are normal. Pupils are equal, round, and reactive to light.   Neck: No JVD present.   Cardiovascular: Normal rate, regular rhythm, normal heart sounds and intact distal pulses. Exam reveals no gallop and no friction rub.   No murmur heard.  Pulmonary/Chest: Effort normal and breath sounds normal. No respiratory distress. He has no wheezes. He has no rales. He exhibits no tenderness.   Musculoskeletal: Normal range of motion. He exhibits no edema.   Neurological: He is alert and oriented to person, place, and time. No cranial nerve deficit.   Skin: Skin is warm and dry. No rash noted. No erythema. No pallor.   Psychiatric: He has a normal mood and affect. His " behavior is normal.   Nursing note and vitals reviewed.      Procedure   Procedures       Assessment/Plan     Problems Addressed this Visit        Cardiovascular and Mediastinum    Coronary artery disease involving native coronary artery of native heart without angina pectoris    Relevant Medications    isosorbide mononitrate (IMDUR) 30 MG 24 hr tablet       Respiratory    Shortness of breath - Primary       Nervous and Auditory    Chest pain            Recommendation  1.  Patient with coronary artery disease but doing well.  No symptoms of angina although he has atypical pain.  I would like to start him on nitrates which I think will help with his transit discomfort.  2.  Dyspnea is very mild at this time with nothing that has been progressive.  We will monitor.  3.  Atypical pain.  As above we will try isosorbide.  We will see patient back for follow-up in 6 months.  Follow-up with primary as scheduled         Floyd Villarreal is a current cigarettes user.  He currently smokes 6 cigarettes per day for a duration of 25 years. I have educated him on the risk of diseases from using tobacco products such as cancer, COPD and heart diease.     I advised him to quit and he is not willing to quit.      Patient's Body mass index is 29.01 kg/m². BMI is above normal parameters. Recommendations include: educational material and referral to primary care.       Electronically signed by:

## 2019-10-28 NOTE — PATIENT INSTRUCTIONS
"Fat and Cholesterol Restricted Eating Plan  Getting too much fat and cholesterol in your diet may cause health problems. Choosing the right foods helps keep your fat and cholesterol at normal levels. This can keep you from getting certain diseases.  Your doctor may recommend an eating plan that includes:  · Total fat: ______% or less of total calories a day.  · Saturated fat: ______% or less of total calories a day.  · Cholesterol: less than _________mg a day.  · Fiber: ______g a day.  What are tips for following this plan?  General tips    · Work with your doctor to lose weight if you need to.  · Avoid:  ? Foods with added sugar.  ? Fried foods.  ? Foods with partially hydrogenated oils.  · Limit alcohol intake to no more than 1 drink a day for nonpregnant women and 2 drinks a day for men. One drink equals 12 oz of beer, 5 oz of wine, or 1½ oz of hard liquor.  Reading food labels  · Check food labels for:  ? Trans fats.  ? Partially hydrogenated oils.  ? Saturated fat (g) in each serving.  ? Cholesterol (mg) in each serving.  ? Fiber (g) in each serving.  · Choose foods with healthy fats, such as:  ? Monounsaturated fats.  ? Polyunsaturated fats.  ? Omega-3 fats.  · Choose grain products that have whole grains. Look for the word \"whole\" as the first word in the ingredient list.  Cooking  · Cook foods using low-fat methods. These include baking, boiling, grilling, and broiling.  · Eat more home-cooked foods. Eat at restaurants and buffets less often.  · Avoid cooking using saturated fats, such as butter, cream, palm oil, palm kernel oil, and coconut oil.  Meal planning    · At meals, divide your plate into four equal parts:  ? Fill one-half of your plate with vegetables and green salads.  ? Fill one-fourth of your plate with whole grains.  ? Fill one-fourth of your plate with low-fat (lean) protein foods.  · Eat fish that is high in omega-3 fats at least two times a week. This includes mackerel, tuna, sardines, and " salmon.  · Eat foods that are high in fiber, such as whole grains, beans, apples, broccoli, carrots, peas, and barley.  Recommended foods  Grains  · Whole grains, such as whole wheat or whole grain breads, crackers, cereals, and pasta. Unsweetened oatmeal, bulgur, barley, quinoa, or brown rice. Corn or whole wheat flour tortillas.  Vegetables  · Fresh or frozen vegetables (raw, steamed, roasted, or grilled). Green salads.  Fruits  · All fresh, canned (in natural juice), or frozen fruits.  Meats and other protein foods  · Ground beef (85% or leaner), grass-fed beef, or beef trimmed of fat. Skinless chicken or turkey. Ground chicken or turkey. Pork trimmed of fat. All fish and seafood. Egg whites. Dried beans, peas, or lentils. Unsalted nuts or seeds. Unsalted canned beans. Nut butters without added sugar or oil.  Dairy  · Low-fat or nonfat dairy products, such as skim or 1% milk, 2% or reduced-fat cheeses, low-fat and fat-free ricotta or cottage cheese, or plain low-fat and nonfat yogurt.  Fats and oils  · Tub margarine without trans fats. Light or reduced-fat mayonnaise and salad dressings. Avocado. Olive, canola, sesame, or safflower oils.  The items listed above may not be a complete list of recommended foods or beverages. Contact your dietitian for more options.  The items listed above may not be a complete list of foods and beverages [you/your child] can eat. Contact a dietitian for more information.  Foods to avoid  Grains  · White bread. White pasta. White rice. Cornbread. Bagels, pastries, and croissants. Crackers and snack foods that contain trans fat and hydrogenated oils.  Vegetables  · Vegetables cooked in cheese, cream, or butter sauce. Fried vegetables.  Fruits  · Canned fruit in heavy syrup. Fruit in cream or butter sauce. Fried fruit.  Meats and other protein foods  · Fatty cuts of meat. Ribs, chicken wings, catalan, sausage, bologna, salami, chitterlings, fatback, hot dogs, bratwurst, and packaged  lunch meats. Liver and organ meats. Whole eggs and egg yolks. Chicken and turkey with skin. Fried meat.  Dairy  · Whole or 2% milk, cream, half-and-half, and cream cheese. Whole milk cheeses. Whole-fat or sweetened yogurt. Full-fat cheeses. Nondairy creamers and whipped toppings. Processed cheese, cheese spreads, and cheese curds.  Beverages  · Alcohol. Sugar-sweetened drinks such as sodas, lemonade, and fruit drinks.  Fats and oils  · Butter, stick margarine, lard, shortening, ghee, or catalan fat. Coconut, palm kernel, and palm oils.  Sweets and desserts  · Corn syrup, sugars, honey, and molasses. Candy. Jam and jelly. Syrup. Sweetened cereals. Cookies, pies, cakes, donuts, muffins, and ice cream.  The items listed above may not be a complete list of foods and beverages to avoid. Contact your dietitian for more information.  The items listed above may not be a complete list of foods and beverages [you/your child] should avoid. Contact a dietitian for more information.  Summary  · Choosing the right foods helps keep your fat and cholesterol at normal levels. This can keep you from getting certain diseases.  · At meals, fill one-half of your plate with vegetables and green salads.  · Eat high-fiber foods, like whole grains, beans, apples, carrots, peas, and barley.  · Limit added sugar, saturated fats, alcohol, and fried foods.  This information is not intended to replace advice given to you by your health care provider. Make sure you discuss any questions you have with your health care provider.  Document Released: 06/18/2013 Document Revised: 09/04/2018 Document Reviewed: 09/04/2018  [employer] Patient Education © 2019 [employer]  BMI for Adults    Body mass index (BMI) is a number that is calculated from a person's weight and height. BMI may help to estimate how much of a person's weight is composed of fat. BMI can help identify those who may be at higher risk for certain medical problems.  How is BMI  "used with adults?  BMI is used as a screening tool to identify possible weight problems. It is used to check whether a person is obese, overweight, healthy weight, or underweight.  How is BMI calculated?  BMI measures your weight and compares it to your height. This can be done either in English ([place] or metric measurements. Note that charts are available to help you find your BMI quickly and easily without having to do these calculations yourself.  To calculate your BMI in English ([place] measurements, your health care provider will:  1. Measure your weight in pounds (lb).  2. Multiply the number of pounds by 703.  ? For example, for a person who weighs 180 lb, multiply that number by 703, which equals 126,540.  3. Measure your height in inches (in). Then multiply that number by itself to get a measurement called \"inches squared.\"  ? For example, for a person who is 70 in tall, the \"inches squared\" measurement is 70 in x 70 in, which equals 4900 inches squared.  4. Divide the total from Step 2 (number of lb x 703) by the total from Step 3 (inches squared): 126,540 ÷ 4900 = 25.8. This is your BMI.  To calculate your BMI in metric measurements, your health care provider will:  1. Measure your weight in kilograms (kg).  2. Measure your height in meters (m). Then multiply that number by itself to get a measurement called \"meters squared.\"  ? For example, for a person who is 1.75 m tall, the \"meters squared\" measurement is 1.75 m x 1.75 m, which is equal to 3.1 meters squared.  3. Divide the number of kilograms (your weight) by the meters squared number. In this example: 70 ÷ 3.1 = 22.6. This is your BMI.  How is BMI interpreted?  To interpret your results, your health care provider will use BMI charts to identify whether you are underweight, normal weight, overweight, or obese. The following guidelines will be used:  · Underweight: BMI less than 18.5.  · Normal weight: BMI between 18.5 and 24.9.  · Overweight: BMI " between 25 and 29.9.  · Obese: BMI of 30 and above.  Please note:  · Weight includes both fat and muscle, so someone with a muscular build, such as an athlete, may have a BMI that is higher than 24.9. In cases like these, BMI is not an accurate measure of body fat.  · To determine if excess body fat is the cause of a BMI of 25 or higher, further assessments may need to be done by a health care provider.  · BMI is usually interpreted in the same way for men and women.  Why is BMI a useful tool?  BMI is useful in two ways:  · Identifying a weight problem that may be related to a medical condition, or that may increase the risk for medical problems.  · Promoting lifestyle and diet changes in order to reach a healthy weight.  Summary  · Body mass index (BMI) is a number that is calculated from a person's weight and height.  · BMI may help to estimate how much of a person's weight is composed of fat. BMI can help identify those who may be at higher risk for certain medical problems.  · BMI can be measured using English measurements or metric measurements.  · To interpret your results, your health care provider will use BMI charts to identify whether you are underweight, normal weight, overweight, or obese.  This information is not intended to replace advice given to you by your health care provider. Make sure you discuss any questions you have with your health care provider.  Document Released: 08/29/2005 Document Revised: 10/31/2018 Document Reviewed: 10/31/2018  [employer] Patient Education © 2019 [employer]

## 2020-05-20 ENCOUNTER — TELEPHONE (OUTPATIENT)
Dept: CARDIOLOGY | Facility: CLINIC | Age: 60
End: 2020-05-20

## 2020-05-20 RX ORDER — PRASUGREL 10 MG/1
10 TABLET, FILM COATED ORAL DAILY
Qty: 30 TABLET | Refills: 1 | Status: SHIPPED | OUTPATIENT
Start: 2020-05-20 | End: 2021-03-06

## 2020-05-20 NOTE — TELEPHONE ENCOUNTER
----- Message from Rylan Narvaez Rep sent at 5/20/2020 10:39 AM EDT -----  [facility]- EFFIENT 10MG

## 2020-07-14 ENCOUNTER — TELEPHONE (OUTPATIENT)
Dept: CARDIOLOGY | Facility: CLINIC | Age: 60
End: 2020-07-14

## 2020-07-14 ENCOUNTER — OFFICE VISIT (OUTPATIENT)
Dept: CARDIOLOGY | Facility: CLINIC | Age: 60
End: 2020-07-14

## 2020-07-14 VITALS
BODY MASS INDEX: 28.7 KG/M2 | HEART RATE: 92 BPM | SYSTOLIC BLOOD PRESSURE: 145 MMHG | HEIGHT: 71 IN | DIASTOLIC BLOOD PRESSURE: 82 MMHG | OXYGEN SATURATION: 97 % | WEIGHT: 205 LBS

## 2020-07-14 DIAGNOSIS — I25.10 CORONARY ARTERY DISEASE INVOLVING NATIVE CORONARY ARTERY OF NATIVE HEART WITHOUT ANGINA PECTORIS: ICD-10-CM

## 2020-07-14 DIAGNOSIS — I10 ESSENTIAL HYPERTENSION: ICD-10-CM

## 2020-07-14 DIAGNOSIS — R09.89 BRUIT OF RIGHT CAROTID ARTERY: Primary | ICD-10-CM

## 2020-07-14 DIAGNOSIS — I65.29 STENOSIS OF CAROTID ARTERY, UNSPECIFIED LATERALITY: ICD-10-CM

## 2020-07-14 PROCEDURE — 99213 OFFICE O/P EST LOW 20 MIN: CPT | Performed by: PHYSICIAN ASSISTANT

## 2020-07-14 PROCEDURE — 93000 ELECTROCARDIOGRAM COMPLETE: CPT | Performed by: PHYSICIAN ASSISTANT

## 2020-07-14 NOTE — PATIENT INSTRUCTIONS
Steps to Quit Smoking  Smoking tobacco is the leading cause of preventable death. It can affect almost every organ in the body. Smoking puts you and people around you at risk for many serious, long-lasting (chronic) diseases. Quitting smoking can be hard, but it is one of the best things that you can do for your health. It is never too late to quit.  How do I get ready to quit?  When you decide to quit smoking, make a plan to help you succeed. Before you quit:  · Pick a date to quit. Set a date within the next 2 weeks to give you time to prepare.  · Write down the reasons why you are quitting. Keep this list in places where you will see it often.  · Tell your family, friends, and co-workers that you are quitting. Their support is important.  · Talk with your doctor about the choices that may help you quit.  · Find out if your health insurance will pay for these treatments.  · Know the people, places, things, and activities that make you want to smoke (triggers). Avoid them.  What first steps can I take to quit smoking?  · Throw away all cigarettes at home, at work, and in your car.  · Throw away the things that you use when you smoke, such as ashtrays and lighters.  · Clean your car. Make sure to empty the ashtray.  · Clean your home, including curtains and carpets.  What can I do to help me quit smoking?  Talk with your doctor about taking medicines and seeing a counselor at the same time. You are more likely to succeed when you do both.  · If you are pregnant or breastfeeding, talk with your doctor about counseling or other ways to quit smoking. Do not take medicine to help you quit smoking unless your doctor tells you to do so.  To quit smoking:  Quit right away  · Quit smoking totally, instead of slowly cutting back on how much you smoke over a period of time.  · Go to counseling. You are more likely to quit if you go to counseling sessions regularly.  Take medicine  You may take medicines to help you quit. Some  medicines need a prescription, and some you can buy over-the-counter. Some medicines may contain a drug called nicotine to replace the nicotine in cigarettes. Medicines may:  · Help you to stop having the desire to smoke (cravings).  · Help to stop the problems that come when you stop smoking (withdrawal symptoms).  Your doctor may ask you to use:  · Nicotine patches, gum, or lozenges.  · Nicotine inhalers or sprays.  · Non-nicotine medicine that is taken by mouth.  Find resources  Find resources and other ways to help you quit smoking and remain smoke-free after you quit. These resources are most helpful when you use them often. They include:  · Online chats with a counselor.  · Phone quitlines.  · Printed self-help materials.  · Support groups or group counseling.  · Text messaging programs.  · Mobile phone apps. Use apps on your mobile phone or tablet that can help you stick to your quit plan. There are many free apps for mobile phones and tablets as well as websites. Examples include Quit Guide from the CDC and smokefree.gov    What things can I do to make it easier to quit?    · Talk to your family and friends. Ask them to support and encourage you.  · Call a phone quitline (0-689-QUITNOW), reach out to support groups, or work with a counselor.  · Ask people who smoke to not smoke around you.  · Avoid places that make you want to smoke, such as:  ? Bars.  ? Parties.  ? Smoke-break areas at work.  · Spend time with people who do not smoke.  · Lower the stress in your life. Stress can make you want to smoke. Try these things to help your stress:  ? Getting regular exercise.  ? Doing deep-breathing exercises.  ? Doing yoga.  ? Meditating.  ? Doing a body scan. To do this, close your eyes, focus on one area of your body at a time from head to toe. Notice which parts of your body are tense. Try to relax the muscles in those areas.  How will I feel when I quit smoking?  Day 1 to 3 weeks  Within the first 24 hours,  you may start to have some problems that come from quitting tobacco. These problems are very bad 2-3 days after you quit, but they do not often last for more than 2-3 weeks. You may get these symptoms:  · Mood swings.  · Feeling restless, nervous, angry, or annoyed.  · Trouble concentrating.  · Dizziness.  · Strong desire for high-sugar foods and nicotine.  · Weight gain.  · Trouble pooping (constipation).  · Feeling like you may vomit (nausea).  · Coughing or a sore throat.  · Changes in how the medicines that you take for other issues work in your body.  · Depression.  · Trouble sleeping (insomnia).  Week 3 and afterward  After the first 2-3 weeks of quitting, you may start to notice more positive results, such as:  · Better sense of smell and taste.  · Less coughing and sore throat.  · Slower heart rate.  · Lower blood pressure.  · Clearer skin.  · Better breathing.  · Fewer sick days.  Quitting smoking can be hard. Do not give up if you fail the first time. Some people need to try a few times before they succeed. Do your best to stick to your quit plan, and talk with your doctor if you have any questions or concerns.  Summary  · Smoking tobacco is the leading cause of preventable death. Quitting smoking can be hard, but it is one of the best things that you can do for your health.  · When you decide to quit smoking, make a plan to help you succeed.  · Quit smoking right away, not slowly over a period of time.  · When you start quitting, seek help from your doctor, family, or friends.  This information is not intended to replace advice given to you by your health care provider. Make sure you discuss any questions you have with your health care provider.  Document Released: 10/14/2010 Document Revised: 03/06/2020 Document Reviewed: 03/07/2020  [employer] Patient Education © 2020 [employer]  How to Quarantine at Home  Information for Patients and Families    These instructions are for people with confirmed or  suspected COVID-19 who do not need to be hospitalized and those with confirmed COVID-19 who were hospitalized and discharged to care for themselves at home.    If you were tested through the Health Department  The Health Department will monitor your wellbeing.  If it is determined that you do not need to be hospitalized and can be isolated at home, you will be monitored by staff from your local or state health department.     If you were tested through a [facility]  You will need to monitor yourself and report changes in your symptoms to your doctor.  See the section below called Monitor Your Symptoms.    Follow these steps until a healthcare provider or local or state health department says you can return to your normal activities.    Stay home except to get medical care  • Restrict activities outside your home, except for getting medical care.   • Do not go to work, school, or public areas.   • Avoid using public transportation, ride-sharing, or taxis.    Separate yourself from other people and animals in your home  People  As much as possible, you should stay in a specific room and away from other people in your home. Also, you should use a separate bathroom, if available.    Animals  You should restrict contact with pets and other animals while you are sick with COVID-19, just like you would around other people. When possible, have another member of your household care for your animals while you are sick. If you are sick with COVID-19, avoid contact with your pet, including petting, snuggling, being kissed or licked, and sharing food. If you must care for your pet or be around animals while you are sick, wash your hands before and after you interact with pets and wear a facemask. See COVID-19 and Animals for more information.    Call ahead before visiting your doctor  If you have a medical appointment, call the healthcare provider and tell them that you have or may have COVID-19. This information will help  the healthcare provider’s office take steps to keep other people from getting infected or exposed.    Wear a facemask  You should wear a facemask when you are around other people (e.g., sharing a room or vehicle) or pets and before you enter a healthcare provider’s office.     If you are not able to wear a facemask (for example, because it causes trouble breathing), then people who live with you should not stay in the same room with you, or they should wear a facemask if they enter your room.    Cover your coughs and sneezes  • Cover your mouth and nose with a tissue when you cough or sneeze.   • Throw used tissues in a lined trash can.   • Immediately wash your hands with soap and water for at least 20 seconds or, if soap and water are not available, clean your hands with an alcohol-based hand  that contains at least 60% alcohol.    Clean your hands often  • Wash your hands often with soap and water for at least 20 seconds, especially after blowing your nose, coughing, or sneezing; going to the bathroom; and before eating or preparing food.     • If soap and water are not readily available, use an alcohol-based hand  with at least 60% alcohol, covering all surfaces of your hands and rubbing them together until they feel dry.    • Soap and water are the best option if hands are visibly dirty. Avoid touching your eyes, nose, and mouth with unwashed hands.    Avoid sharing personal household items  • You should not share dishes, drinking glasses, cups, eating utensils, towels, or bedding with other people or pets in your home.   • After using these items, they should be washed thoroughly with soap and water.    Clean all “high-touch” surfaces everyday  • High touch surfaces include counters, tabletops, doorknobs, bathroom fixtures, toilets, phones, keyboards, tablets, and bedside tables.   • Also, clean any surfaces that may have blood, stool, or body fluids on them.   • Use a household cleaning  spray or wipe, according to the label instructions. Labels contain instructions for safe and effective use of the cleaning product, including precautions you should take when applying the product, such as wearing gloves and making sure you have good ventilation during use of the product.    Monitor your symptoms  • Seek prompt medical attention if your illness is worsening (e.g., difficulty breathing).   • Before seeking care, call your healthcare provider and tell them that you have, or are being evaluated for, COVID-19.   • Put on a facemask before you enter the facility.     • These steps will help the healthcare provider’s office to keep other people in the office or waiting room from getting infected or exposed.   • Persons who are placed under active monitoring or facilitated self-monitoring should follow instructions provided by their local health department or occupational health professionals, as appropriate.  • If you have a medical emergency and need to call 911, notify the dispatch personnel that you have, or are being evaluated for COVID-19. If possible, put on a facemask before emergency medical services arrive.    Discontinuing home isolation  Patients with confirmed COVID-19 should remain under home isolation precautions until the risk of secondary transmission to others is thought to be low. The decision to discontinue home isolation precautions should be made on a case-by-case basis, in consultation with healthcare providers and state and local health departments.    The below content are for household members, intimate partners, and caregivers of a patient with symptomatic laboratory-confirmed COVID-19 or a patient under investigation:    Household members, intimate partners, and caregivers may have close contact with a person with symptomatic, laboratory-confirmed COVID-19 or a person under investigation.     Close contacts should monitor their health; they should call their healthcare provider  right away if they develop symptoms suggestive of COVID-19 (e.g., fever, cough, shortness of breath)     Close contacts should also follow these recommendations:  • Make sure that you understand and can help the patient follow their healthcare provider’s instructions for medication(s) and care. You should help the patient with basic needs in the home and provide support for getting groceries, prescriptions, and other personal needs.  • Monitor the patient’s symptoms. If the patient is getting sicker, call his or her healthcare provider and tell them that the patient has laboratory-confirmed COVID-19. This will help the healthcare provider’s office take steps to keep other people in the office or waiting room from getting infected. Ask the healthcare provider to call the local or state health department for additional guidance. If the patient has a medical emergency and you need to call 911, notify the dispatch personnel that the patient has, or is being evaluated for COVID-19.  • Household members should stay in another room or be  from the patient as much as possible. Household members should use a separate bedroom and bathroom, if available.  • Prohibit visitors who do not have an essential need to be in the home.  • Household members should care for any pets in the home. Do not handle pets or other animals while sick.  For more information, see COVID-19 and Animals.  • Make sure that shared spaces in the home have good air flow, such as by an air conditioner or an opened window, weather permitting.  • Perform hand hygiene frequently. Wash your hands often with soap and water for at least 20 seconds or use an alcohol-based hand  that contains 60 to 95% alcohol, covering all surfaces of your hands and rubbing them together until they feel dry. Soap and water should be used preferentially if hands are visibly dirty.  • Avoid touching your eyes, nose, and mouth with unwashed hands.  • The patient  should wear a facemask when you are around other people. If the patient is not able to wear a facemask (for example, because it causes trouble breathing), you, as the caregiver, should wear a mask when you are in the same room as the patient.  • Wear a disposable facemask and gloves when you touch or have contact with the patient’s blood, stool, or body fluids, such as saliva, sputum, nasal mucus, vomit, or urine.   o Throw out disposable facemasks and gloves after using them. Do not reuse.  o When removing personal protective equipment, first remove and dispose of gloves. Then, immediately clean your hands with soap and water or alcohol-based hand . Next, remove and dispose of facemask, and immediately clean your hands again with soap and water or alcohol-based hand .  • Avoid sharing household items with the patient. You should not share dishes, drinking glasses, cups, eating utensils, towels, bedding, or other items. After the patient uses these items, you should wash them thoroughly (see below “Wash laundry thoroughly”).  • Clean all “high-touch” surfaces, such as counters, tabletops, doorknobs, bathroom fixtures, toilets, phones, keyboards, tablets, and bedside tables, every day. Also, clean any surfaces that may have blood, stool, or body fluids on them.   o Use a household cleaning spray or wipe, according to the label instructions. Labels contain instructions for safe and effective use of the cleaning product including precautions you should take when applying the product, such as wearing gloves and making sure you have good ventilation during use of the product.  • Wash laundry thoroughly.   o Immediately remove and wash clothes or bedding that have blood, stool, or body fluids on them.  o Wear disposable gloves while handling soiled items and keep soiled items away from your body. Clean your hands (with soap and water or an alcohol-based hand ) immediately after removing your  gloves.  o Read and follow directions on labels of laundry or clothing items and detergent. In general, using a normal laundry detergent according to washing machine instructions and dry thoroughly using the warmest temperatures recommended on the clothing label.  • Place all used disposable gloves, facemasks, and other contaminated items in a lined container before disposing of them with other household waste. Clean your hands (with soap and water or an alcohol-based hand ) immediately after handling these items. Soap and water should be used preferentially if hands are visibly dirty.  • Discuss any additional questions with your state or local health department or healthcare provider.    Adapted from information provided by the Centers for Disease Control and Prevention.  For more information, visit https://www.cdc.gov/coronavirus/2019-ncov/hcp/guidance-prevent-spread.htmlFat and Cholesterol Restricted Eating Plan  Getting too much fat and cholesterol in your diet may cause health problems. Choosing the right foods helps keep your fat and cholesterol at normal levels. This can keep you from getting certain diseases.  Your doctor may recommend an eating plan that includes:  · Total fat: ______% or less of total calories a day.  · Saturated fat: ______% or less of total calories a day.  · Cholesterol: less than _________mg a day.  · Fiber: ______g a day.  What are tips for following this plan?  Meal planning  · At meals, divide your plate into four equal parts:  ? Fill one-half of your plate with vegetables and green salads.  ? Fill one-fourth of your plate with whole grains.  ? Fill one-fourth of your plate with low-fat (lean) protein foods.  · Eat fish that is high in omega-3 fats at least two times a week. This includes mackerel, tuna, sardines, and salmon.  · Eat foods that are high in fiber, such as whole grains, beans, apples, broccoli, carrots, peas, and barley.  General tips    · Work with your  "doctor to lose weight if you need to.  · Avoid:  ? Foods with added sugar.  ? Fried foods.  ? Foods with partially hydrogenated oils.  · Limit alcohol intake to no more than 1 drink a day for nonpregnant women and 2 drinks a day for men. One drink equals 12 oz of beer, 5 oz of wine, or 1½ oz of hard liquor.  Reading food labels  · Check food labels for:  ? Trans fats.  ? Partially hydrogenated oils.  ? Saturated fat (g) in each serving.  ? Cholesterol (mg) in each serving.  ? Fiber (g) in each serving.  · Choose foods with healthy fats, such as:  ? Monounsaturated fats.  ? Polyunsaturated fats.  ? Omega-3 fats.  · Choose grain products that have whole grains. Look for the word \"whole\" as the first word in the ingredient list.  Cooking  · Cook foods using low-fat methods. These include baking, boiling, grilling, and broiling.  · Eat more home-cooked foods. Eat at restaurants and buffets less often.  · Avoid cooking using saturated fats, such as butter, cream, palm oil, palm kernel oil, and coconut oil.  Recommended foods    Fruits  · All fresh, canned (in natural juice), or frozen fruits.  Vegetables  · Fresh or frozen vegetables (raw, steamed, roasted, or grilled). Green salads.  Grains  · Whole grains, such as whole wheat or whole grain breads, crackers, cereals, and pasta. Unsweetened oatmeal, bulgur, barley, quinoa, or brown rice. Corn or whole wheat flour tortillas.  Meats and other protein foods  · Ground beef (85% or leaner), grass-fed beef, or beef trimmed of fat. Skinless chicken or turkey. Ground chicken or turkey. Pork trimmed of fat. All fish and seafood. Egg whites. Dried beans, peas, or lentils. Unsalted nuts or seeds. Unsalted canned beans. Nut butters without added sugar or oil.  Dairy  · Low-fat or nonfat dairy products, such as skim or 1% milk, 2% or reduced-fat cheeses, low-fat and fat-free ricotta or cottage cheese, or plain low-fat and nonfat yogurt.  Fats and oils  · Tub margarine without trans " fats. Light or reduced-fat mayonnaise and salad dressings. Avocado. Olive, canola, sesame, or safflower oils.  The items listed above may not be a complete list of foods and beverages you can eat. Contact a dietitian for more information.  Foods to avoid  Fruits  · Canned fruit in heavy syrup. Fruit in cream or butter sauce. Fried fruit.  Vegetables  · Vegetables cooked in cheese, cream, or butter sauce. Fried vegetables.  Grains  · White bread. White pasta. White rice. Cornbread. Bagels, pastries, and croissants. Crackers and snack foods that contain trans fat and hydrogenated oils.  Meats and other protein foods  · Fatty cuts of meat. Ribs, chicken wings, catalan, sausage, bologna, salami, chitterlings, fatback, hot dogs, bratwurst, and packaged lunch meats. Liver and organ meats. Whole eggs and egg yolks. Chicken and turkey with skin. Fried meat.  Dairy  · Whole or 2% milk, cream, half-and-half, and cream cheese. Whole milk cheeses. Whole-fat or sweetened yogurt. Full-fat cheeses. Nondairy creamers and whipped toppings. Processed cheese, cheese spreads, and cheese curds.  Beverages  · Alcohol. Sugar-sweetened drinks such as sodas, lemonade, and fruit drinks.  Fats and oils  · Butter, stick margarine, lard, shortening, ghee, or catalan fat. Coconut, palm kernel, and palm oils.  Sweets and desserts  · Corn syrup, sugars, honey, and molasses. Candy. Jam and jelly. Syrup. Sweetened cereals. Cookies, pies, cakes, donuts, muffins, and ice cream.  The items listed above may not be a complete list of foods and beverages you should avoid. Contact a dietitian for more information.  Summary  · Choosing the right foods helps keep your fat and cholesterol at normal levels. This can keep you from getting certain diseases.  · At meals, fill one-half of your plate with vegetables and green salads.  · Eat high-fiber foods, like whole grains, beans, apples, carrots, peas, and barley.  · Limit added sugar, saturated fats, alcohol, and  "fried foods.  This information is not intended to replace advice given to you by your health care provider. Make sure you discuss any questions you have with your health care provider.  Document Released: 06/18/2013 Document Revised: 08/21/2019 Document Reviewed: 09/04/2018  [employer] Patient Education © 2020 [employer]  BMI for Adults    Body mass index (BMI) is a number that is calculated from a person's weight and height. BMI may help to estimate how much of a person's weight is composed of fat. BMI can help identify those who may be at higher risk for certain medical problems.  How is BMI used with adults?  BMI is used as a screening tool to identify possible weight problems. It is used to check whether a person is obese, overweight, healthy weight, or underweight.  How is BMI calculated?  BMI measures your weight and compares it to your height. This can be done either in English ([place] or metric measurements. Note that charts are available to help you find your BMI quickly and easily without having to do these calculations yourself.  To calculate your BMI in English ([place] measurements, your health care provider will:  1. Measure your weight in pounds (lb).  2. Multiply the number of pounds by 703.  ? For example, for a person who weighs 180 lb, multiply that number by 703, which equals 126,540.  3. Measure your height in inches (in). Then multiply that number by itself to get a measurement called \"inches squared.\"  ? For example, for a person who is 70 in tall, the \"inches squared\" measurement is 70 in x 70 in, which equals 4900 inches squared.  4. Divide the total from Step 2 (number of lb x 703) by the total from Step 3 (inches squared): 126,540 ÷ 4900 = 25.8. This is your BMI.  To calculate your BMI in metric measurements, your health care provider will:  1. Measure your weight in kilograms (kg).  2. Measure your height in meters (m). Then multiply that number by itself to get a measurement called \"meters " "squared.\"  ? For example, for a person who is 1.75 m tall, the \"meters squared\" measurement is 1.75 m x 1.75 m, which is equal to 3.1 meters squared.  3. Divide the number of kilograms (your weight) by the meters squared number. In this example: 70 ÷ 3.1 = 22.6. This is your BMI.  How is BMI interpreted?  To interpret your results, your health care provider will use BMI charts to identify whether you are underweight, normal weight, overweight, or obese. The following guidelines will be used:  · Underweight: BMI less than 18.5.  · Normal weight: BMI between 18.5 and 24.9.  · Overweight: BMI between 25 and 29.9.  · Obese: BMI of 30 and above.  Please note:  · Weight includes both fat and muscle, so someone with a muscular build, such as an athlete, may have a BMI that is higher than 24.9. In cases like these, BMI is not an accurate measure of body fat.  · To determine if excess body fat is the cause of a BMI of 25 or higher, further assessments may need to be done by a health care provider.  · BMI is usually interpreted in the same way for men and women.  Why is BMI a useful tool?  BMI is useful in two ways:  · Identifying a weight problem that may be related to a medical condition, or that may increase the risk for medical problems.  · Promoting lifestyle and diet changes in order to reach a healthy weight.  Summary  · Body mass index (BMI) is a number that is calculated from a person's weight and height.  · BMI may help to estimate how much of a person's weight is composed of fat. BMI can help identify those who may be at higher risk for certain medical problems.  · BMI can be measured using English measurements or metric measurements.  · To interpret your results, your health care provider will use BMI charts to identify whether you are underweight, normal weight, overweight, or obese.  This information is not intended to replace advice given to you by your health care provider. Make sure you discuss any questions " you have with your health care provider.  Document Released: 08/29/2005 Document Revised: 11/30/2018 Document Reviewed: 10/31/2018  Elsevier Patient Education © 2020 [employer]

## 2020-07-14 NOTE — TELEPHONE ENCOUNTER
Per Tony, send letter to Dr. Ruiz in [facility] stating pt can hold Effient x5-7 days, needs to continue ASA 81mg daily, no Lovenox needed.       Typed up letter, per Tony. Called Dr. Ruiz's office at [phone], no answer.

## 2020-07-14 NOTE — PROGRESS NOTES
Problem list     Subjective   Floyd Villarreal is a 60 y.o. male     Chief Complaint   Patient presents with   • Follow-up   • Coronary Artery Disease   Problem list  1.  Coronary artery disease  1.1 cardiac catheterization February 2018 at hospital [facility] with 100% occlusion of the proximal RCA status post thrombectomy and stenting x3 nonobstructive LAD and circumflex  1.2 cardiac catheterization June 2019 because of NSTEMI demonstrated high-grade circumflex disease status post stenting of the native circumflex and OM1 with nonobstructive disease otherwise medical management recommended  2.  Preserved systolic function  3.  Hypertension  4.  Dyslipidemia  5.  Diabetes mellitus  6.  Carotid artery stenosis  6.1 nonobstructive carotids by duplex in 2019.  Less than 50% stenosis bilaterally    HPI    Patient is a 60-year-old male that presents back for routine follow-up.  Patient has done remarkably well.  We follow him routinely in the setting of coronary disease and he has had multivessel stenting.  Most recently, catheterization in June 2019 with stenting of the circumflex and OM.    He feels remarkably well.  He does not experience any chest discomfort.  No shortness of breath.  No PND or orthopnea    Patient does not palpitate or have dysrhythmic symptoms.  He otherwise feels remarkably well      Current Outpatient Medications on File Prior to Visit   Medication Sig Dispense Refill   • amitriptyline (ELAVIL) 25 MG tablet Take 25 mg by mouth Every Night.     • aspirin 81 MG EC tablet Take 81 mg by mouth Daily.     • atorvastatin (LIPITOR) 40 MG tablet Take 1 tablet by mouth Every Night. (Patient taking differently: Take 80 mg by mouth Every Night.) 30 tablet 6   • carvedilol (COREG) 25 MG tablet Take 25 mg by mouth 2 (Two) Times a Day With Meals.     • Coenzyme Q10 400 MG capsule Take 400 mg by mouth Daily.     • Empagliflozin (JARDIANCE) 10 MG tablet Take 10 mg by mouth Daily.     • fenofibrate  (TRICOR) 48 MG tablet Take 48 mg by mouth Daily.     • glimepiride (AMARYL) 4 MG tablet Take 4 mg by mouth 2 (Two) Times a Day.     • isosorbide mononitrate (IMDUR) 30 MG 24 hr tablet Take 1 tablet by mouth Every Morning. 30 tablet 11   • Liraglutide (VICTOZA SC) Inject 1.8 mg under the skin into the appropriate area as directed Daily.     • losartan (COZAAR) 100 MG tablet Take 100 mg by mouth Daily.     • metFORMIN (GLUCOPHAGE) 500 MG tablet Take 500 mg by mouth 4 (Four) Times a Day.     • nicotine (NICODERM CQ) 21 MG/24HR patch Place 1 patch on the skin as directed by provider Daily.     • prasugrel (EFFIENT) 10 MG tablet Take 1 tablet by mouth Daily. 30 tablet 1     No current facility-administered medications on file prior to visit.        Patient has no known allergies.    Past Medical History:   Diagnosis Date   • Coronary arteriosclerosis after percutaneous transluminal coronary angioplasty (PTCA)    • Coronary arteriosclerosis after percutaneous transluminal coronary angioplasty (PTCA)    • Coronary artery disease    • Diabetes mellitus (CMS/HCC)    • Hyperlipidemia    • Hypertension    • Myocardial infarction (CMS/HCC)     MI 2019    • Stroke (CMS/HCC)        Social History     Socioeconomic History   • Marital status:      Spouse name: Not on file   • Number of children: Not on file   • Years of education: Not on file   • Highest education level: Not on file   Tobacco Use   • Smoking status: Current Every Day Smoker     Packs/day: 0.25     Types: Cigarettes     Last attempt to quit: 2019     Years since quittin.0   • Smokeless tobacco: Never Used   Substance and Sexual Activity   • Alcohol use: No     Frequency: Never   • Drug use: No       Family History   Problem Relation Age of Onset   • Hypertension Mother    • Lung cancer Mother    • Hypertension Father    • Cancer Father        Review of Systems   Constitutional: Negative for fatigue.   HENT: Negative for congestion, rhinorrhea and  "sore throat.    Eyes: Positive for visual disturbance (glasses daily ).   Respiratory: Negative for chest tightness and shortness of breath.    Cardiovascular: Negative for chest pain (Denies CP), palpitations and leg swelling.   Gastrointestinal: Negative for abdominal pain, blood in stool, constipation, diarrhea, nausea and vomiting.   Endocrine: Negative for cold intolerance and heat intolerance.   Genitourinary: Negative for difficulty urinating, dysuria, frequency, hematuria and urgency.   Musculoskeletal: Negative for arthralgias, back pain and neck pain.   Skin: Negative for rash and wound.   Allergic/Immunologic: Negative for environmental allergies and food allergies.   Neurological: Positive for headaches (occasionally ). Negative for dizziness, syncope, weakness, light-headedness and numbness.   Hematological: Bruises/bleeds easily (both ).   Psychiatric/Behavioral: Positive for sleep disturbance (States he's up and down all night, denies soA at HS ).       Objective   Vitals:    07/14/20 1002   BP: 145/82   Pulse: 92   SpO2: 97%   Weight: 93 kg (205 lb)   Height: 180.3 cm (71\")      /82   Pulse 92   Ht 180.3 cm (71\")   Wt 93 kg (205 lb)   SpO2 97%   BMI 28.59 kg/m²     Lab Results (most recent)     None          Physical Exam   Constitutional: He is oriented to person, place, and time. He appears well-developed and well-nourished. No distress.   HENT:   Head: Normocephalic and atraumatic.   Eyes: Conjunctivae are normal. Right eye exhibits no discharge. Left eye exhibits no discharge. No scleral icterus.   Neck: No JVD present.   Cardiovascular: Normal rate, regular rhythm and normal heart sounds. Exam reveals no gallop and no friction rub.   No murmur heard.  Pulmonary/Chest: Effort normal and breath sounds normal. No respiratory distress. He has no wheezes. He has no rales. He exhibits no tenderness.   Musculoskeletal: Normal range of motion. He exhibits no edema.   Neurological: He is alert " and oriented to person, place, and time. No cranial nerve deficit.   Skin: Skin is warm and dry. No rash noted. No erythema. No pallor.   Psychiatric: He has a normal mood and affect. His behavior is normal.   Nursing note and vitals reviewed.      Procedure     ECG 12 Lead  Date/Time: 7/14/2020 10:04 AM  Performed by: Tony Stevenson PA  Authorized by: Tony Stevenson PA   Comparison: compared with previous ECG from 5/15/2019  Comments: EKG demonstrates sinus rhythm at 88 bpm with no acute ST changes.               Assessment/Plan     Problems Addressed this Visit        Cardiovascular and Mediastinum    Bruit of right carotid artery - Primary    Relevant Orders    ECG 12 Lead    Duplex Carotid Ultrasound CAR    Stenosis of carotid artery    Relevant Orders    ECG 12 Lead    Duplex Carotid Ultrasound CAR    Coronary artery disease involving native coronary artery of native heart without angina pectoris    Relevant Orders    ECG 12 Lead    Essential hypertension    Relevant Orders    ECG 12 Lead        Recommendation  1.  Patient is a 60-year-old male that presents back for routine follow-up.  Patient is doing markedly well with no ischemic symptoms    2.  Patient with carotid artery stenosis by last duplex.  We would like to reevaluate with mild bruit on examination.    3.  Patient with hypertension but doing well.  We will continue current medical regimen  4.  We will see patient back for follow-up in 6 months to year or sooner if carotid duplex show any significant abnormalities.  He will follow with primary as scheduled             Floyd Villarreal  reports that he has been smoking cigarettes. He has been smoking about 0.25 packs per day. He has never used smokeless tobacco.. I have educated him on the risk of diseases from using tobacco products such as cancer, COPD and heart diease.     I advised him to quit and he is not willing to quit.    Patient's Body mass index is 28.59 kg/m². BMI is above normal  parameters. Recommendations include: educational material.       Electronically signed by:

## 2020-07-15 ENCOUNTER — HOSPITAL ENCOUNTER (OUTPATIENT)
Dept: CARDIOLOGY | Facility: HOSPITAL | Age: 60
Discharge: HOME | End: 2020-07-15
Admitting: PHYSICIAN ASSISTANT

## 2020-07-15 DIAGNOSIS — R09.89 BRUIT OF RIGHT CAROTID ARTERY: ICD-10-CM

## 2020-07-15 DIAGNOSIS — I65.29 STENOSIS OF CAROTID ARTERY, UNSPECIFIED LATERALITY: ICD-10-CM

## 2020-07-15 PROCEDURE — 93880 EXTRACRANIAL BILAT STUDY: CPT

## 2020-07-15 PROCEDURE — 93880 EXTRACRANIAL BILAT STUDY: CPT | Performed by: INTERNAL MEDICINE

## 2020-07-15 NOTE — TELEPHONE ENCOUNTER
Called Dr. Ruiz's office, requested their fax number. Fax # [phone].     Faxed clearance.

## 2020-07-22 ENCOUNTER — TELEPHONE (OUTPATIENT)
Dept: CARDIOLOGY | Facility: CLINIC | Age: 60
End: 2020-07-22

## 2020-07-22 NOTE — TELEPHONE ENCOUNTER
Pt LVM requesting results of US.   #[phone]      Per chart review, results aren't in yet.     First attempt to reach pt. Left a voicemail for pt to return my call at [phone], asked if okay to leave a detailed VM.

## 2020-07-23 NOTE — TELEPHONE ENCOUNTER
Informed pt that we haven't received results yet. He verbalized understanding and stated to leave him a detailed message w/ results if we can't reach him.

## 2020-07-25 LAB
BH CV ECHO MEAS - BSA(HAYCOCK): 2.2 M^2
BH CV ECHO MEAS - BSA: 2.1 M^2
BH CV ECHO MEAS - BZI_BMI: 28.6 KILOGRAMS/M^2
BH CV ECHO MEAS - BZI_METRIC_HEIGHT: 180.3 CM
BH CV ECHO MEAS - BZI_METRIC_WEIGHT: 93 KG
BH CV XLRA MEAS LEFT BULB EDV: 14.3 CM/SEC
BH CV XLRA MEAS LEFT BULB PSV: 62.9 CM/SEC
BH CV XLRA MEAS LEFT CCA RATIO VEL: 51.3 CM/SEC
BH CV XLRA MEAS LEFT DIST CCA EDV: 15.4 CM/SEC
BH CV XLRA MEAS LEFT DIST CCA PSV: 51.7 CM/SEC
BH CV XLRA MEAS LEFT DIST ICA EDV: -35.4 CM/SEC
BH CV XLRA MEAS LEFT DIST ICA PSV: -90.4 CM/SEC
BH CV XLRA MEAS LEFT ICA RATIO VEL: -89.9 CM/SEC
BH CV XLRA MEAS LEFT ICA/CCA RATIO: -1.8
BH CV XLRA MEAS LEFT MID ICA EDV: -32.4 CM/SEC
BH CV XLRA MEAS LEFT MID ICA PSV: -82 CM/SEC
BH CV XLRA MEAS LEFT PROX CCA EDV: 19.9 CM/SEC
BH CV XLRA MEAS LEFT PROX CCA PSV: 76.1 CM/SEC
BH CV XLRA MEAS LEFT PROX ECA EDV: -17.1 CM/SEC
BH CV XLRA MEAS LEFT PROX ECA PSV: -110.3 CM/SEC
BH CV XLRA MEAS LEFT PROX ICA EDV: -27.6 CM/SEC
BH CV XLRA MEAS LEFT PROX ICA PSV: -83.3 CM/SEC
BH CV XLRA MEAS LEFT VERTEBRAL A EDV: 9.3 CM/SEC
BH CV XLRA MEAS LEFT VERTEBRAL A PSV: 18 CM/SEC
BH CV XLRA MEAS RIGHT BULB EDV: -25 CM/SEC
BH CV XLRA MEAS RIGHT BULB PSV: -58.9 CM/SEC
BH CV XLRA MEAS RIGHT CCA RATIO VEL: 50.3 CM/SEC
BH CV XLRA MEAS RIGHT DIST CCA EDV: 10.8 CM/SEC
BH CV XLRA MEAS RIGHT DIST CCA PSV: 50.6 CM/SEC
BH CV XLRA MEAS RIGHT DIST ICA EDV: -39.6 CM/SEC
BH CV XLRA MEAS RIGHT DIST ICA PSV: -93.7 CM/SEC
BH CV XLRA MEAS RIGHT ICA RATIO VEL: -93 CM/SEC
BH CV XLRA MEAS RIGHT ICA/CCA RATIO: -1.8
BH CV XLRA MEAS RIGHT MID ICA EDV: -35.2 CM/SEC
BH CV XLRA MEAS RIGHT MID ICA PSV: -77.9 CM/SEC
BH CV XLRA MEAS RIGHT PROX CCA EDV: 16 CM/SEC
BH CV XLRA MEAS RIGHT PROX CCA PSV: 71.1 CM/SEC
BH CV XLRA MEAS RIGHT PROX ECA EDV: -19.5 CM/SEC
BH CV XLRA MEAS RIGHT PROX ECA PSV: -126.3 CM/SEC
BH CV XLRA MEAS RIGHT PROX ICA EDV: -20.7 CM/SEC
BH CV XLRA MEAS RIGHT PROX ICA PSV: -76.7 CM/SEC
BH CV XLRA MEAS RIGHT VERTEBRAL A EDV: 25.9 CM/SEC
BH CV XLRA MEAS RIGHT VERTEBRAL A PSV: 71.5 CM/SEC

## 2020-07-27 ENCOUNTER — TELEPHONE (OUTPATIENT)
Dept: CARDIOLOGY | Facility: CLINIC | Age: 60
End: 2020-07-27

## 2020-07-27 NOTE — TELEPHONE ENCOUNTER
Duplex Carotid US:  Summary: Nonobstructive carotid disease bilaterally as above.  Antegrade flow in both vertebral arteries      Keep appt on 1/7/2021      Informed pt of his results and of follow up appt, he verbalized understanding.

## 2021-02-26 ENCOUNTER — OFFICE VISIT (OUTPATIENT)
Dept: CARDIOLOGY | Facility: CLINIC | Age: 61
End: 2021-02-26

## 2021-02-26 VITALS
BODY MASS INDEX: 29.54 KG/M2 | OXYGEN SATURATION: 96 % | HEIGHT: 71 IN | HEART RATE: 96 BPM | SYSTOLIC BLOOD PRESSURE: 136 MMHG | DIASTOLIC BLOOD PRESSURE: 76 MMHG | WEIGHT: 211 LBS

## 2021-02-26 DIAGNOSIS — M79.89 LEG SWELLING: ICD-10-CM

## 2021-02-26 DIAGNOSIS — I10 ESSENTIAL HYPERTENSION: Primary | ICD-10-CM

## 2021-02-26 DIAGNOSIS — I25.10 CORONARY ARTERY DISEASE INVOLVING NATIVE CORONARY ARTERY OF NATIVE HEART WITHOUT ANGINA PECTORIS: ICD-10-CM

## 2021-02-26 PROCEDURE — 99214 OFFICE O/P EST MOD 30 MIN: CPT | Performed by: NURSE PRACTITIONER

## 2021-02-26 RX ORDER — ATORVASTATIN CALCIUM 80 MG/1
80 TABLET, FILM COATED ORAL DAILY
COMMUNITY

## 2021-02-26 RX ORDER — HYDROCHLOROTHIAZIDE 12.5 MG/1
12.5 CAPSULE, GELATIN COATED ORAL DAILY
Qty: 90 CAPSULE | Refills: 3 | Status: SHIPPED | OUTPATIENT
Start: 2021-02-26

## 2021-03-23 ENCOUNTER — TELEPHONE (OUTPATIENT)
Dept: CARDIOLOGY | Facility: CLINIC | Age: 61
End: 2021-03-23

## 2021-03-23 NOTE — TELEPHONE ENCOUNTER
Received a VM from [facility] stating they need PA on pt's Jardiance and wanted to know how we want to proceed.       I called the px and told them that we never prescribed rx and are a cardiology office, stated that I am unsure who prescribed, but that PCP may be able to assist.

## 2021-04-21 ENCOUNTER — TELEPHONE (OUTPATIENT)
Dept: CARDIOLOGY | Facility: CLINIC | Age: 61
End: 2021-04-21

## 2021-04-21 NOTE — TELEPHONE ENCOUNTER
Liyah stokes/ [facility] LVM stating they need PA for pt's Jardiance, stated it was prescribed by our office. Stated he's been w/o it.         Per chart review, rx is from an outside provider and it's for DM. PCP needs to be consulted.       Called px, informed Liyah and informed him that we didn't order DM med and to ask PCP. He stated he didn't understand and asked for the number for Tony. I reiterated that Tony works here, but didn't prescribe the med and to call PCP. This conversation continued, I once again stated to call PCP, as we don't prescribe DM meds as we are pt's cardiologist/heart doctor and to ask a different provider for PA.

## 2021-08-27 ENCOUNTER — OFFICE VISIT (OUTPATIENT)
Dept: CARDIOLOGY | Facility: CLINIC | Age: 61
End: 2021-08-27

## 2021-08-27 VITALS
DIASTOLIC BLOOD PRESSURE: 60 MMHG | SYSTOLIC BLOOD PRESSURE: 103 MMHG | WEIGHT: 208 LBS | BODY MASS INDEX: 29.12 KG/M2 | HEART RATE: 91 BPM | HEIGHT: 71 IN | OXYGEN SATURATION: 96 %

## 2021-08-27 DIAGNOSIS — I49.3 FREQUENT PVCS: Primary | ICD-10-CM

## 2021-08-27 DIAGNOSIS — I10 ESSENTIAL HYPERTENSION: ICD-10-CM

## 2021-08-27 DIAGNOSIS — N52.2 DRUG-INDUCED ERECTILE DYSFUNCTION: ICD-10-CM

## 2021-08-27 DIAGNOSIS — I25.10 CORONARY ARTERY DISEASE INVOLVING NATIVE CORONARY ARTERY OF NATIVE HEART WITHOUT ANGINA PECTORIS: ICD-10-CM

## 2021-08-27 PROCEDURE — 99214 OFFICE O/P EST MOD 30 MIN: CPT | Performed by: NURSE PRACTITIONER

## 2021-08-27 PROCEDURE — 93000 ELECTROCARDIOGRAM COMPLETE: CPT | Performed by: NURSE PRACTITIONER

## 2021-08-27 RX ORDER — METOPROLOL SUCCINATE 100 MG/1
100 TABLET, EXTENDED RELEASE ORAL DAILY
Qty: 30 TABLET | Refills: 6 | Status: SHIPPED | OUTPATIENT
Start: 2021-08-27 | End: 2022-11-01 | Stop reason: SDUPTHER

## 2021-08-27 NOTE — PATIENT INSTRUCTIONS
Coronary Angiogram With Stent  Coronary angiogram with stent placement is a procedure to widen or open a narrow blood vessel of the heart (coronary artery). Arteries may become blocked by cholesterol buildup (plaques) in the lining of the artery wall. When a coronary artery becomes partially blocked, blood flow to that area decreases. This may lead to chest pain or a heart attack (myocardial infarction).  A stent is a small piece of metal that looks like mesh or spring. Stent placement may be done as treatment after a heart attack, or to prevent a heart attack if a blocked artery is found by a coronary angiogram.  Let your health care provider know about:  · Any allergies you have, including allergies to medicines or contrast dye.  · All medicines you are taking, including vitamins, herbs, eye drops, creams, and over-the-counter medicines.  · Any problems you or family members have had with anesthetic medicines.  · Any blood disorders you have.  · Any surgeries you have had.  · Any medical conditions you have, including kidney problems or kidney failure.  · Whether you are pregnant or may be pregnant.  · Whether you are breastfeeding.  What are the risks?  Generally, this is a safe procedure. However, serious problems may occur, including:  · Damage to nearby structures or organs, such as the heart, blood vessels, or kidneys.  · A return of blockage.  · Bleeding, infection, or bruising at the insertion site.  · A collection of blood under the skin (hematoma) at the insertion site.  · A blood clot in another part of the body.  · Allergic reaction to medicines or dyes.  · Bleeding into the abdomen (retroperitoneal bleeding).  · Stroke (rare).  · Heart attack (rare).  What happens before the procedure?  Staying hydrated  Follow instructions from your health care provider about hydration, which may include:  · Up to 2 hours before the procedure - you may continue to drink clear liquids, such as water, clear fruit juice,  black coffee, and plain tea.    Eating and drinking restrictions  Follow instructions from your health care provider about eating and drinking, which may include:  · 8 hours before the procedure - stop eating heavy meals or foods, such as meat, fried foods, or fatty foods.  · 6 hours before the procedure - stop eating light meals or foods, such as toast or cereal.  · 2 hours before the procedure - stop drinking clear liquids.  Medicines  Ask your health care provider about:  · Changing or stopping your regular medicines. This is especially important if you are taking diabetes medicines or blood thinners.  · Taking medicines such as aspirin and ibuprofen. These medicines can thin your blood. Do not take these medicines unless your health care provider tells you to take them.  ? Generally, aspirin is recommended before a thin tube, called a catheter, is passed through a blood vessel and inserted into the heart (cardiac catheterization).  · Taking over-the-counter medicines, vitamins, herbs, and supplements.  General instructions  · Do not use any products that contain nicotine or tobacco for at least 4 weeks before the procedure. These products include cigarettes, e-cigarettes, and chewing tobacco. If you need help quitting, ask your health care provider.  · Plan to have someone take you home from the hospital or clinic.  · If you will be going home right after the procedure, plan to have someone with you for 24 hours.  · You may have tests and imaging procedures.  · Ask your health care provider:  ? How your insertion site will be marked. Ask which artery will be used for the procedure.  What steps will be taken to help prevent infection. These may include:  Acute Coronary Syndrome  Acute coronary syndrome (ACS) is a serious problem in which there is suddenly not enough blood and oxygen reaching the heart. ACS can result in chest pain or a heart attack.  This condition is a medical emergency. If you have any symptoms  of this condition, get help right away.  What are the causes?  This condition may be caused by:  A buildup of fat and cholesterol inside the arteries (atherosclerosis). This is the most common cause. The buildup (plaque) can cause blood vessels in the heart (coronary arteries) to become narrow or blocked, which reduces blood flow to the heart. Plaque can also break off and lead to a clot, which can block an artery and cause a heart attack or stroke.  Sudden tightening of the muscles around the coronary arteries (coronary spasm).  Tearing of a coronary artery (spontaneous coronary artery dissection).  Very low blood pressure (hypotension).  An abnormal heartbeat (arrhythmia).  Other medical conditions that cause a decrease of oxygen to the heart, such as anemiaorrespiratory failure.  Using cocaine or methamphetamine.  What increases the risk?  The following factors may make you more likely to develop this condition:  Age. The risk for ACS increases as you get older.  History of chest pain, heart attack, peripheral artery disease, or stroke.  Having taken chemotherapy or immune-suppressing medicines.  Being male.  Family history of chest pain, heart disease, or stroke.  Smoking.  Not exercising enough.  Being overweight.  High cholesterol.  High blood pressure (hypertension).  Diabetes.  Excessive alcohol use.  What are the signs or symptoms?  Common symptoms of this condition include:  Chest pain. The pain may last a long time, or it may stop and come back (recur). It may feel like:  Crushing or squeezing.  Tightness, pressure, fullness, or heaviness.  Arm, neck, jaw, or back pain.  Heartburn or indigestion.  Shortness of breath.  Nausea.  Sudden cold sweats.  Light-headedness.  Dizziness or passing out.  Tiredness (fatigue).  Sometimes there are no symptoms.  How is this diagnosed?  This condition may be diagnosed based on:  Your medical history and symptoms.  Imaging tests, such as:  An electrocardiogram (ECG).  This measures the heart's electrical activity.  X-rays.  CT scan.  A coronary angiogram. For this test, dye is injected into the heart arteries and then X-rays are taken.  Myocardial perfusion imaging. This test shows how well blood flows through your heart muscle.  Blood tests. These may be repeated at certain time intervals.  Exercise stress testing.  Echocardiogram. This is a test that uses sound waves to produce detailed images of the heart.  How is this treated?  Treatment for this condition may include:  Oxygen therapy.  Medicines, such as:  Antiplatelet medicines and blood-thinning medicines, such as aspirin. These help prevent blood clots.  Medicine that dissolves any blood clots (fibrinolytic therapy).  Blood pressure medicines.  Nitroglycerin. This helps widen blood vessels to improve blood flow.  Pain medicine.  Cholesterol-lowering medicine.  Surgery, such as:  Coronary angioplasty with stent placement. This involves placing a small piece of metal that looks like mesh or a spring into a narrow coronary artery. This widens the artery and keeps it open.  Coronary artery bypass surgery. This involves taking a section of a blood vessel from a different part of your body and placing it on the blocked coronary artery to allow blood to flow around the blockage.  Cardiac rehabilitation. This is a program that includes exercise training, education, and counseling to help you recover.  Follow these instructions at home:  Eating and drinking  Eat a heart-healthy diet that includes whole grains, fruits and vegetables, lean proteins, and low-fat or nonfat dairy products.  Limit how much salt (sodium) you eat as told by your health care provider. Follow instructions from your health care provider about any other eating or drinking restrictions, such as limiting foods that are high in fat and processed sugars.  Use healthy cooking methods such as roasting, grilling, broiling, baking, poaching, steaming, or  stir-frying.  Work with a dietitian to follow a heart-healthy eating plan.  Medicines  Take over-the-counter and prescription medicines only as told by your health care provider.  Do not take these medicines unless your health care provider approves:  Vitamin supplements that contain vitamin A or vitamin E.  NSAIDs, such as ibuprofen, naproxen, or celecoxib.  Hormone replacement therapy that contains estrogen.  If you are taking blood thinners:  Talk with your health care provider before you take any medicines that contain aspirin or NSAIDs. These medicines increase your risk for dangerous bleeding.  Take your medicine exactly as told, at the same time every day.  Avoid activities that could cause injury or bruising, and follow instructions about how to prevent falls.  Wear a medical alert bracelet, and carry a card that lists what medicines you take.  Activity  Follow your cardiac rehabilitation program. Do exercises as told by your physical therapist.  Ask your health care provider what activities and exercises are safe for you. Follow his or her instructions about lifting, driving, or climbing stairs.  Lifestyle  Do not use any products that contain nicotine or tobacco, such as cigarettes, e-cigarettes, and chewing tobacco. If you need help quitting, ask your health care provider.  Do not drink alcohol if your health care provider tells you not to drink.  If you drink alcohol:  Limit how much you have to 0-1 drink a day.  Be aware of how much alcohol is in your drink. In the [place] one drink equals one 12 oz bottle of beer (355 mL), one 5 oz glass of wine (148 mL), or one 1½ oz glass of hard liquor (44 mL).  Maintain a healthy weight. If you need to lose weight, work with your health care provider to do so safely.  General instructions  Tell all the health care providers who provide care for you about your heart condition, including your dentist. This may affect the medicines or treatment you receive.  Manage any  other health conditions you have, such as hypertension or diabetes. These conditions affect your heart.  Pay attention to your mental health. You may be at higher risk for depression.  Find ways to manage stress.  Talk to your health care provider about depression screening and treatment.  Keep your vaccinations up to date.  Get the flu shot (influenza vaccine) every year.  Get the pneumococcal vaccine if you are age 65 or older.  If directed, monitor your blood pressure at home.  Keep all follow-up visits as told by your health care provider. This is important.  Contact a health care provider if you:  Feel overwhelmed or sad.  Have trouble doing your daily activities.  Get help right away if you:  Have pain in your chest, neck, arm, jaw, stomach, or back that recurs, and:  It lasts for more than a few minutes.  It is not relieved by taking the medicineyour health care provider prescribed.  Have unexplained:  Heavy sweating.  Heartburn or indigestion.  Nausea or vomiting.  Shortness of breath.  Difficulty breathing.  Fatigue.  Nervousness or anxiety.  Weakness.  Diarrhea.  Dark stools or blood in your stool.  Have sudden light-headedness or dizziness.  Have blood pressure that is higher than 180/120.  Faint.  Have thoughts about hurting yourself.  These symptoms may represent a serious problem that is an emergency. Do not wait to see if the symptoms will go away. Get medical help right away. Call your local emergency services (911 in the [place] Do not drive yourself to the hospital.   Summary  Acute coronary syndrome (ACS) is when there is not enough blood and oxygen being supplied to the heart. ACS can result in chest pain or a heart attack.  Acute coronary syndrome is a medical emergency. If you have any symptoms of this condition, get help right away.  Treatment includes medicines and procedures to open the blocked arteries and restore blood flow.  This information is not intended to replace advice given to you  by your health care provider. Make sure you discuss any questions you have with your health care provider.  Document Revised: 05/20/2020 Document Reviewed: 12/30/2019  [employer]vier Patient Education © 2021 [employer]  ?     Hypertension, Adult  Hypertension is another name for high blood pressure. High blood pressure forces your heart to work harder to pump blood. This can cause problems over time.  There are two numbers in a blood pressure reading. There is a top number (systolic) over a bottom number (diastolic). It is best to have a blood pressure that is below 120/80. Healthy choices can help lower your blood pressure, or you may need medicine to help lower it.  What are the causes?  The cause of this condition is not known. Some conditions may be related to high blood pressure.  What increases the risk?  Smoking.  Having type 2 diabetes mellitus, high cholesterol, or both.  Not getting enough exercise or physical activity.  Being overweight.  Having too much fat, sugar, calories, or salt (sodium) in your diet.  Drinking too much alcohol.  Having long-term (chronic) kidney disease.  Having a family history of high blood pressure.  Age. Risk increases with age.  Race. You may be at higher risk if you are .  Gender. Men are at higher risk than women before age 45. After age 65, women are at higher risk than men.  Having obstructive sleep apnea.  Stress.  What are the signs or symptoms?  High blood pressure may not cause symptoms. Very high blood pressure (hypertensive crisis) may cause:  Headache.  Feelings of worry or nervousness (anxiety).  Shortness of breath.  Nosebleed.  A feeling of being sick to your stomach (nausea).  Throwing up (vomiting).  Changes in how you see.  Very bad chest pain.  Seizures.  How is this treated?  This condition is treated by making healthy lifestyle changes, such as:  Eating healthy foods.  Exercising more.  Drinking less alcohol.  Your health care provider may  prescribe medicine if lifestyle changes are not enough to get your blood pressure under control, and if:  Your top number is above 130.  Your bottom number is above 80.  Your personal target blood pressure may vary.  Follow these instructions at home:  Eating and drinking    If told, follow the DASH eating plan. To follow this plan:  Fill one half of your plate at each meal with fruits and vegetables.  Fill one fourth of your plate at each meal with whole grains. Whole grains include whole-wheat pasta, brown rice, and whole-grain bread.  Eat or drink low-fat dairy products, such as skim milk or low-fat yogurt.  Fill one fourth of your plate at each meal with low-fat (lean) proteins. Low-fat proteins include fish, chicken without skin, eggs, beans, and tofu.  Avoid fatty meat, cured and processed meat, or chicken with skin.  Avoid pre-made or processed food.  Eat less than 1,500 mg of salt each day.  Do not drink alcohol if:  Your doctor tells you not to drink.  You are pregnant, may be pregnant, or are planning to become pregnant.  If you drink alcohol:  Limit how much you use to:  0-1 drink a day for women.  0-2 drinks a day for men.  Be aware of how much alcohol is in your drink. In the [place] one drink equals one 12 oz bottle of beer (355 mL), one 5 oz glass of wine (148 mL), or one 1½ oz glass of hard liquor (44 mL).  Lifestyle    Work with your doctor to stay at a healthy weight or to lose weight. Ask your doctor what the best weight is for you.  Get at least 30 minutes of exercise most days of the week. This may include walking, swimming, or biking.  Get at least 30 minutes of exercise that strengthens your muscles (resistance exercise) at least 3 days a week. This may include lifting weights or doing Pilates.  Do not use any products that contain nicotine or tobacco, such as cigarettes, e-cigarettes, and chewing tobacco. If you need help quitting, ask your doctor.  Check your blood pressure at home as told by  your doctor.  Keep all follow-up visits as told by your doctor. This is important.  Medicines  Take over-the-counter and prescription medicines only as told by your doctor. Follow directions carefully.  Do not skip doses of blood pressure medicine. The medicine does not work as well if you skip doses. Skipping doses also puts you at risk for problems.  Ask your doctor about side effects or reactions to medicines that you should watch for.  Contact a doctor if you:  Think you are having a reaction to the medicine you are taking.  Have headaches that keep coming back (recurring).  Feel dizzy.  Have swelling in your ankles.  Have trouble with your vision.  Get help right away if you:  Get a very bad headache.  Start to feel mixed up (confused).  Feel weak or numb.  Feel faint.  Have very bad pain in your:  Chest.  Belly (abdomen).  Throw up more than once.  Have trouble breathing.  Summary  Hypertension is another name for high blood pressure.  High blood pressure forces your heart to work harder to pump blood.  For most people, a normal blood pressure is less than 120/80.  Making healthy choices can help lower blood pressure. If your blood pressure does not get lower with healthy choices, you may need to take medicine.  This information is not intended to replace advice given to you by your health care provider. Make sure you discuss any questions you have with your health care provider.  Document Revised: 08/28/2019 Document Reviewed: 08/28/2019  [employer] Patient Education © 2021 [employer]  ?   ?   § Removing hair at the insertion site.  § Washing skin with a germ-killing soap.  § Taking antibiotic medicine.  What happens during the procedure?    · An IV will be inserted into one of your veins.  · Electrodes may be placed on your chest to monitor your heart rate during the procedure.  · You will be given one or more of the following:  ? A medicine to help you relax (sedative).  ? A medicine to numb the area  (local anesthetic) for catheter insertion.  · A small incision will be made for catheter insertion.  · The catheter will be inserted into an artery using a guide wire. The location may be in your groin, your wrist, or the fold of your arm (near your elbow).  · An X-ray procedure (fluoroscopy) will be used to help guide the catheter to the opening of the heart arteries.  · A dye will be injected into the catheter. X-rays will be taken. The dye helps to show where any narrowing or blockages are located in the arteries.  · Tell your health care provider if you have chest pain or trouble breathing.  · A tiny wire will be guided to the blocked spot, and a balloon will be inflated to make the artery wider.  · The stent will be expanded to crush the plaques into the wall of the vessel. The stent will hold the area open and improve the blood flow. Most stents have a drug coating to reduce the risk of the stent narrowing over time.  · The artery may be made wider using a drill, laser, or other tools that remove plaques.  · The catheter will be removed when the blood flow improves. The stent will stay where it was placed, and the lining of the artery will grow over it.  · A bandage (dressing) will be placed on the insertion site. Pressure will be applied to stop bleeding.  · The IV will be removed.  This procedure may vary among health care providers and hospitals.  What happens after the procedure?  · Your blood pressure, heart rate, breathing rate, and blood oxygen level will be monitored until you leave the hospital or clinic.  · If the procedure is done through the leg, you will lie flat in bed for a few hours or for as long as told by your health care provider. You will be instructed not to bend or cross your legs.  · The insertion site and the pulse in your foot or wrist will be checked often.  · You may have more blood tests, X-rays, and a test that records the electrical activity of your heart (electrocardiogram, or  ECG).  · Do not drive for 24 hours if you were given a sedative during your procedure.  Summary  · Coronary angiogram with stent placement is a procedure to widen or open a narrowed coronary artery. This is done to treat heart problems.  · Before the procedure, let your health care provider know about all the medical conditions and surgeries you have or have had.  · This is a safe procedure. However, some problems may occur, including damage to nearby structures or organs, bleeding, blood clots, or allergies.  · Follow your health care provider's instructions about eating, drinking, medicines, and other lifestyle changes, such as quitting tobacco use before the procedure.  This information is not intended to replace advice given to you by your health care provider. Make sure you discuss any questions you have with your health care provider.  Document Revised: 07/08/2020 Document Reviewed: 07/08/2020  [employer] Patient Education © 2021 [employer]

## 2021-08-27 NOTE — PROGRESS NOTES
Subjective     Floyd Villarreal is a 61 y.o. male who presents to day for Hypertension (6 mo f/u ).    CHIEF COMPLIANT  Chief Complaint   Patient presents with   • Hypertension     6 mo f/u        Active Problems:  Problem List Items Addressed This Visit        Cardiac and Vasculature    Coronary artery disease involving native coronary artery of native heart without angina pectoris    Relevant Medications    metoprolol succinate XL (TOPROL-XL) 100 MG 24 hr tablet    Essential hypertension    Relevant Medications    metoprolol succinate XL (TOPROL-XL) 100 MG 24 hr tablet      Other Visit Diagnoses     Frequent PVCs    -  Primary    Relevant Medications    metoprolol succinate XL (TOPROL-XL) 100 MG 24 hr tablet    Drug-induced erectile dysfunction          Problem list  1.  Coronary artery disease  1.1 cardiac catheterization February 2018 at hospital in [facility] with 100% occlusion of the proximal RCA status post thrombectomy and stenting x3 nonobstructive LAD and circumflex  1.2 cardiac catheterization June 2019 because of NSTEMI demonstrated high-grade circumflex disease status post stenting of the native circumflex and OM1 with nonobstructive disease otherwise medical management recommended  2.  Preserved systolic function  3.  Hypertension  4.  Dyslipidemia  5.  Diabetes mellitus  6.  Carotid artery stenosis  6.1 nonobstructive carotids by duplex in 2019.  Less than 50% stenosis bilaterally  6.2 carotid duplex 7/20: Nonobstructive carotid artery disease.    HPI  HPI  Mr. Villarreal is a 61-year-old male patient who is being followed up today for chronic arterial hypertension and coronary artery disease.  Patient does have chronic arterial hypertension which is well controlled today at 103/60 heart rate of 91.  He is currently been treated with hydrochlorothiazide, losartan, and metoprolol.  He denies any associated symptoms with his chronic arterial hypertension.  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  We have done a carotid duplex within the last year that identified nonobstructive carotid artery disease.  He is on losartan, metoprolol, and atorvastatin 80 mg daily for his chronic arterial hypertension as well as aspirin 81 mg daily.  He denies any angina anginal equivalent symptoms.  He does report erectile dysfunction which may be induced by his medications.  He denies chest pain, shortness of breath, lower extremity edema, palpitations, fatigue, dizziness, lightheadedness, headaches, syncope, PND, orthopnea, or other neurological problems.  PRIOR MEDS  Current Outpatient Medications on File Prior to Visit   Medication Sig Dispense Refill   • amitriptyline (ELAVIL) 25 MG tablet Take 25 mg by mouth Every Night.     • aspirin 81 MG EC tablet Take 81 mg by mouth Daily.     • atorvastatin (LIPITOR) 80 MG tablet Take 80 mg by mouth Daily.     • Empagliflozin (JARDIANCE) 10 MG tablet Take 10 mg by mouth Daily.     • glimepiride (AMARYL) 4 MG tablet Take 4 mg by mouth 2 (Two) Times a Day.     • hydroCHLOROthiazide (MICROZIDE) 12.5 MG capsule Take 1 capsule by mouth Daily. 90 capsule 3   • isosorbide mononitrate (IMDUR) 30 MG 24 hr tablet Take 1 tablet by mouth Every Morning. 30 tablet 11   • Liraglutide (VICTOZA SC) Inject 1.8 mg under the skin into the appropriate area as directed Daily.     • losartan (COZAAR) 100 MG tablet Take 100 mg by mouth Daily.     • metFORMIN (GLUCOPHAGE) 500 MG tablet Take 500 mg by mouth 2 (two) times a day. 2 tabs bid     • [DISCONTINUED] carvedilol (COREG) 25 MG tablet Take 25 mg by mouth 2 (Two) Times a Day With Meals.       No current facility-administered medications on file prior to visit.       ALLERGIES  Patient has no known allergies.    HISTORY  Past Medical History:   Diagnosis Date   • Coronary  "arteriosclerosis after percutaneous transluminal coronary angioplasty (PTCA)    • Coronary arteriosclerosis after percutaneous transluminal coronary angioplasty (PTCA)    • Coronary artery disease    • Diabetes mellitus (CMS/HCC)    • Hyperlipidemia    • Hypertension    • Myocardial infarction (CMS/HCC)     MI 2019    • Stroke (CMS/HCC)        Social History     Socioeconomic History   • Marital status:      Spouse name: Not on file   • Number of children: Not on file   • Years of education: Not on file   • Highest education level: Not on file   Tobacco Use   • Smoking status: Current Every Day Smoker     Packs/day: 1.00     Years: 35.00     Pack years: 35.00     Types: Cigarettes     Last attempt to quit: 2019     Years since quittin.1   • Smokeless tobacco: Never Used   Substance and Sexual Activity   • Alcohol use: No   • Drug use: No   • Sexual activity: Defer       Family History   Problem Relation Age of Onset   • Hypertension Mother    • Lung cancer Mother    • Hypertension Father    • Cancer Father        Review of Systems   Constitutional: Negative for chills, diaphoresis, fatigue and fever.   HENT: Negative.    Eyes: Negative.    Respiratory: Negative for apnea, cough, chest tightness, shortness of breath and wheezing.    Cardiovascular: Negative for chest pain, palpitations and leg swelling.   Gastrointestinal: Negative.  Negative for blood in stool.   Endocrine: Negative.    Genitourinary: Negative.  Negative for hematuria.   Musculoskeletal: Negative.    Skin: Negative.    Allergic/Immunologic: Negative.    Neurological: Negative for dizziness, syncope, weakness, light-headedness, numbness and headaches.   Hematological: Does not bruise/bleed easily.   Psychiatric/Behavioral: Negative for sleep disturbance.       Objective     VITALS: /60 (BP Location: Left arm, Patient Position: Sitting)   Pulse 91   Ht 180.3 cm (71\")   Wt 94.3 kg (208 lb)   SpO2 96%   BMI 29.01 kg/m² "     LABS:   Lab Results (most recent)     None          IMAGING:   No Images in the past 120 days found..    EXAM:  Physical Exam  Vitals and nursing note reviewed.   Constitutional:       Appearance: He is well-developed.   HENT:      Head: Normocephalic.   Eyes:      Pupils: Pupils are equal, round, and reactive to light.   Neck:      Thyroid: No thyroid mass.      Vascular: No carotid bruit or JVD.      Trachea: Trachea and phonation normal.   Cardiovascular:      Rate and Rhythm: Normal rate and regular rhythm.      Pulses:           Radial pulses are 2+ on the right side and 2+ on the left side.        Posterior tibial pulses are 2+ on the right side and 2+ on the left side.      Heart sounds: Normal heart sounds. No murmur heard.   No friction rub. No gallop.    Pulmonary:      Effort: Pulmonary effort is normal. No respiratory distress.      Breath sounds: Normal breath sounds. No wheezing or rales.   Abdominal:      General: Bowel sounds are normal.      Palpations: Abdomen is soft.   Musculoskeletal:         General: No swelling. Normal range of motion.      Cervical back: Neck supple.   Skin:     General: Skin is warm and dry.      Capillary Refill: Capillary refill takes less than 2 seconds.      Findings: No rash.   Neurological:      Mental Status: He is alert and oriented to person, place, and time.   Psychiatric:         Speech: Speech normal.         Behavior: Behavior normal.         Thought Content: Thought content normal.         Judgment: Judgment normal.         Procedure     ECG 12 Lead    Date/Time: 8/27/2021 10:45 AM  Performed by: Conrado Bishop APRN  Authorized by: Conrado Bishop APRN   Comparison: compared with previous ECG from 7/14/2020  Comparison to previous ECG: Multifocal PVCs  Rhythm: sinus rhythm  Ectopy: multifocal PVCs  Rate: normal  BPM: 90  QRS axis: normal  Other findings: non-specific ST-T wave changes  Comments:  ms  No acute changes               Assessment/Plan     Diagnosis Plan   1. Frequent PVCs  metoprolol succinate XL (TOPROL-XL) 100 MG 24 hr tablet   2. Essential hypertension  metoprolol succinate XL (TOPROL-XL) 100 MG 24 hr tablet   3. Coronary artery disease involving native coronary artery of native heart without angina pectoris     4. Drug-induced erectile dysfunction     1.  Patient did have an EKG today that showed normal sinus rhythm multifocal PVCs.  He is currently on carvedilol 25 mg twice daily.  Like to switch on metoprolol 100 mg daily to see if we can better control his PVCs and decrease his erectile dysfunction.  2.  Patient's blood pressure is well controlled on current blood pressure medication regimen.  No medication changes are warranted at this time.  Patient advised to monitor blood pressure on a daily basis and report any persistent highs or lows.  Set goal blood pressure for patient at 130/80 or below.  3.  Patient does have a history of coronary artery disease with stenting.  He is on guideline driven medication therapy with aspirin 81 mg daily and atorvastatin 80 mg daily.  Is also on beta-blocker therapy as well.  We will continue these medication without any change.  We will continue to monitor.  4.  We did have an extensive conversation in regards to his drug-induced erectile dysfunction which may be likely caused by his beta-blocker therapy.  We will switch his carvedilol to metoprolol and he will notify me in 2 to 4 weeks to see if there is any improvement.  We did discuss phosphodiesterase inhibitors however he is on isosorbide which is relieved his chest pain I would like to try the switch of beta-blockers to see if we can decrease his level of erectile dysfunction.  5.  Informed of signs and symptoms of ACS and advised to seek emergent treatment for any new worsening symptoms.  Patient also advised sooner follow-up as needed.  Also advised to follow-up with family doctor as needed  This note is dictated utilizing voice recognition  software.  Although this record has been proof read, transcriptional errors may still be present. If questions occur regarding the content of this record please do not hesitate to call our office.  I have reviewed and confirmed the accuracy of the ROS as documented by the MA/LPN/RN LAWSON Finch    Return in about 6 months (around 2/27/2022), or if symptoms worsen or fail to improve.    Diagnoses and all orders for this visit:    1. Frequent PVCs (Primary)  -     metoprolol succinate XL (TOPROL-XL) 100 MG 24 hr tablet; Take 1 tablet by mouth Daily.  Dispense: 30 tablet; Refill: 6    2. Essential hypertension  -     metoprolol succinate XL (TOPROL-XL) 100 MG 24 hr tablet; Take 1 tablet by mouth Daily.  Dispense: 30 tablet; Refill: 6    3. Coronary artery disease involving native coronary artery of native heart without angina pectoris    4. Drug-induced erectile dysfunction    Other orders  -     ECG 12 Lead                   MEDS ORDERED DURING VISIT:  New Medications Ordered This Visit   Medications   • metoprolol succinate XL (TOPROL-XL) 100 MG 24 hr tablet     Sig: Take 1 tablet by mouth Daily.     Dispense:  30 tablet     Refill:  6           This document has been electronically signed by LAWSON Finch Jr.  August 27, 2021 13:16 EDT

## 2022-11-01 DIAGNOSIS — I49.3 FREQUENT PVCS: ICD-10-CM

## 2022-11-01 DIAGNOSIS — I10 ESSENTIAL HYPERTENSION: ICD-10-CM

## 2022-11-01 RX ORDER — METOPROLOL SUCCINATE 100 MG/1
100 TABLET, EXTENDED RELEASE ORAL DAILY
Qty: 30 TABLET | Refills: 6 | Status: SHIPPED | OUTPATIENT
Start: 2022-11-01

## 2023-03-20 ENCOUNTER — TELEPHONE (OUTPATIENT)
Dept: CARDIOLOGY | Facility: CLINIC | Age: 63
End: 2023-03-20

## 2023-03-20 NOTE — TELEPHONE ENCOUNTER
Caller: Floyd Villarreal    Relationship: Self    Best call back number: [phone]    What is the best time to reach you: ANY    Who are you requesting to speak with (clinical staff, provider,  specific staff member): CLINICAL      What was the call regarding: PT IS CALLING TO LET THE OFFICE KNOW THAT HE COULDN'T MAKE THE 2:15 APPT. 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 HE BELIEVES IT'S THE FIRST WEEK OF MAY THAT IT NEEDS TO BE SENT IN. PLEASE REACH OUT.     Do you require a callback: YES

## 2023-03-20 NOTE — TELEPHONE ENCOUNTER
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

## 2023-04-14 ENCOUNTER — OFFICE VISIT (OUTPATIENT)
Dept: CARDIOLOGY | Facility: CLINIC | Age: 63
End: 2023-04-14
Payer: COMMERCIAL

## 2023-04-14 VITALS
HEART RATE: 91 BPM | HEIGHT: 71 IN | SYSTOLIC BLOOD PRESSURE: 132 MMHG | DIASTOLIC BLOOD PRESSURE: 76 MMHG | BODY MASS INDEX: 29.03 KG/M2 | WEIGHT: 207.4 LBS | OXYGEN SATURATION: 94 %

## 2023-04-14 DIAGNOSIS — I49.3 FREQUENT PVCS: Primary | ICD-10-CM

## 2023-04-14 DIAGNOSIS — I25.10 CORONARY ARTERY DISEASE INVOLVING NATIVE CORONARY ARTERY OF NATIVE HEART WITHOUT ANGINA PECTORIS: ICD-10-CM

## 2023-04-14 DIAGNOSIS — I10 ESSENTIAL HYPERTENSION: ICD-10-CM

## 2023-04-14 PROCEDURE — 93000 ELECTROCARDIOGRAM COMPLETE: CPT | Performed by: NURSE PRACTITIONER

## 2023-04-14 PROCEDURE — 99213 OFFICE O/P EST LOW 20 MIN: CPT | Performed by: NURSE PRACTITIONER

## 2023-04-14 NOTE — PROGRESS NOTES
"Subjective     Floyd Villarreal is a 63 y.o. male who presents to day for Follow-up (To be able to get DOT physical ).    CHIEF COMPLIANT  Chief Complaint   Patient presents with   • Follow-up     To be able to get DOT physical        Active Problems:  Problem List Items Addressed This Visit    None        Problem list  1.  Coronary artery disease  1.1 cardiac catheterization February 2018 at hospital in [facility] with 100% occlusion of the proximal RCA status post thrombectomy and stenting x3 nonobstructive LAD and circumflex  1.2 cardiac catheterization June 2019 because of NSTEMI demonstrated high-grade circumflex disease status post stenting of the native circumflex and OM1 with nonobstructive disease otherwise medical management recommended  2.  Preserved systolic function  3.  Hypertension  4.  Dyslipidemia  5.  Diabetes mellitus  6.  Carotid artery stenosis  6.1 nonobstructive carotids by duplex in 2019.  Less than 50% stenosis bilaterally  6.2 carotid duplex 7/20: Nonobstructive carotid artery disease.      DIOMEDES Villarreal is a 63-year-old male patient for who presents for follow-up due to coronary artery disease. He does have a significant history of coronary artery disease in which he is on antianginal therapy, isosorbide, high intensity statin therapy of atorvastatin, and antiplatelet therapy of aspirin. He adds that he previously had stents placed and notes he felt \"like he was going to die\" when an medication was added to his IV     He also has chronic arterial hypertension with a blood pressure of 132/76 mmHg, heart rate of 91 beats per minute.The patient continues taking metoprolol losartan, and isosorbide.       The patient states he is doing well.  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    The patient states he has neuropathy in his feet and describes he feels like he is \"standing in rocks barefoot.\"    He denies chest pain, shortness of " breath, palpitations, left arm pain, left jaw pain, or neck pain. He states he has been able to complete his normal activities.He states he is no longer experiencing chest pain since taking isosorbide.    He states his heart rate occasionally increases above 90 beats per minute. He denies PVC associated symptoms.    He denies bradycardia.     The patient states he had laboratory studies performed approximately 2 months ago. He states his potassium was slightly elevated.  PRIOR MEDS  Current Outpatient Medications on File Prior to Visit   Medication Sig Dispense Refill   • amitriptyline (ELAVIL) 25 MG tablet Take 1 tablet by mouth Every Night.     • aspirin 81 MG EC tablet Take 1 tablet by mouth Daily.     • atorvastatin (LIPITOR) 80 MG tablet Take 1 tablet by mouth Daily.     • empagliflozin (JARDIANCE) 10 MG tablet tablet Take 1 tablet by mouth Daily.     • glimepiride (AMARYL) 4 MG tablet Take 1 tablet by mouth 2 (Two) Times a Day.     • isosorbide mononitrate (IMDUR) 30 MG 24 hr tablet Take 1 tablet by mouth Every Morning. 30 tablet 11   • Liraglutide (VICTOZA SC) Inject 1.8 mg under the skin into the appropriate area as directed Daily.     • losartan (COZAAR) 100 MG tablet Take 1 tablet by mouth Daily.     • metFORMIN (GLUCOPHAGE) 500 MG tablet Take 1 tablet by mouth 2 (two) times a day. 2 tabs bid     • metoprolol succinate XL (TOPROL-XL) 100 MG 24 hr tablet Take 1 tablet by mouth Daily. 30 tablet 6     No current facility-administered medications on file prior to visit.       ALLERGIES  Patient has no known allergies.    HISTORY  Past Medical History:   Diagnosis Date   • Coronary arteriosclerosis after percutaneous transluminal coronary angioplasty (PTCA)    • Coronary arteriosclerosis after percutaneous transluminal coronary angioplasty (PTCA)    • Coronary artery disease    • Diabetes mellitus    • Hyperlipidemia    • Hypertension    • Myocardial infarction     MI 6/2019    • Stroke        Social History  "    Socioeconomic History   • Marital status:    Tobacco Use   • Smoking status: Every Day     Packs/day: 1.00     Years: 35.00     Pack years: 35.00     Types: Cigarettes     Last attempt to quit: 6/22/2019     Years since quitting: 3.8   • Smokeless tobacco: Never   Substance and Sexual Activity   • Alcohol use: No   • Drug use: No   • Sexual activity: Defer       Family History   Problem Relation Age of Onset   • Hypertension Mother    • Lung cancer Mother    • Hypertension Father    • Cancer Father        Review of Systems   Constitutional: Negative for chills, fatigue and fever.   HENT: Negative for congestion, rhinorrhea and sore throat.    Respiratory: Negative for chest tightness and shortness of breath.    Cardiovascular: Positive for leg swelling. Negative for chest pain and palpitations.   Gastrointestinal: Negative for constipation, diarrhea and nausea.   Musculoskeletal: Positive for back pain. Negative for arthralgias and neck pain.   Allergic/Immunologic: Positive for environmental allergies. Negative for food allergies.   Neurological: Negative for dizziness, syncope, weakness and light-headedness.   Hematological: Does not bruise/bleed easily.   Psychiatric/Behavioral: Negative for sleep disturbance.       Objective     VITALS: /76 (BP Location: Right arm, Patient Position: Sitting, Cuff Size: Adult)   Pulse 91   Ht 180.3 cm (71\")   Wt 94.1 kg (207 lb 6.4 oz)   SpO2 94%   BMI 28.93 kg/m²     LABS:   Lab Results (most recent)     None          IMAGING:   No Images in the past 120 days found..    EXAM:  Physical Exam  Vitals and nursing note reviewed.   Constitutional:       Appearance: He is well-developed.   HENT:      Head: Normocephalic and atraumatic.   Eyes:      Pupils: Pupils are equal, round, and reactive to light.   Neck:      Vascular: Carotid bruit ( Right) present. No JVD.   Cardiovascular:      Rate and Rhythm: Normal rate. Rhythm irregular. Occasional extrasystoles are " present.     Pulses:           Carotid pulses are 2+ on the right side and 2+ on the left side.       Radial pulses are 2+ on the right side and 2+ on the left side.      Heart sounds: Normal heart sounds. No murmur heard.    No gallop.   Pulmonary:      Effort: Pulmonary effort is normal. No respiratory distress.      Breath sounds: Normal breath sounds.   Abdominal:      General: Bowel sounds are normal. There is no distension.      Palpations: Abdomen is soft.      Tenderness: There is no abdominal tenderness.   Musculoskeletal:         General: Normal range of motion.      Cervical back: Neck supple.   Skin:     General: Skin is warm and dry.   Neurological:      Mental Status: He is alert and oriented to person, place, and time.      Cranial Nerves: No cranial nerve deficit.      Sensory: No sensory deficit.   Psychiatric:         Speech: Speech normal.         Behavior: Behavior normal.         Thought Content: Thought content normal.         Judgment: Judgment normal.         Procedure     ECG 12 Lead    Date/Time: 4/14/2023 11:49 AM  Performed by: Conrado Bishop APRN  Authorized by: Conrado Bishop APRN   Comparison: compared with previous ECG from 8/27/2021  Similar to previous ECG  Rhythm: sinus rhythm  Ectopy: multifocal PVCs  Rate: normal  BPM: 87  Conduction: non-specific intraventricular conduction delay  QRS axis: normal  Other findings: non-specific ST-T wave changes  Comments: QTc 433 ms  No acute changes               Assessment & Plan   No diagnosis found.  Plan  1. The patient will continue taking his current medications as previously prescribed.  2.  Patient's blood pressure is controlled on current blood pressure medication regimen.  No medication changes are warranted at this time.  Patient advised to monitor blood pressure on a daily basis and report any persistent highs or lows.  Set goal blood pressure for patient at 130/80 or below.  3. The patient was advised if he experiences any  chest pain, chest pressure, chest tightness, left arm pain, left jaw pain, left neck pain, or increasing shortness of breath, he is to contact us.  4.  Patient does have a significant history of coronary artery disease with previous stenting.  Currently denies any angina anginal equivalent symptoms.  We will continue to monitor.  5.  Patient does have PVCs that are noted on his EKG.  These are not sensed.  They do to be multifocal in nature.  6.  Informed of signs and symptoms of ACS and advised to seek emergent treatment for any new worsening symptoms.  Patient also advised sooner follow-up as needed.  Also advised to follow-up with family doctor as needed  This note is dictated utilizing voice recognition software.  Although this record has been proof read, transcriptional errors may still be present. If questions occur regarding the content of this record please do not hesitate to call our office.  I have reviewed and confirmed the accuracy of the ROS as documented by the MA/LPN/RN LAWSON Finch    Assessment  1. Coronary artery disease  2. Chronic arterial hypertension  3. Premature ventricular contractions  Return in about 1 year (around 4/14/2024), or if symptoms worsen or fail to improve.    There are no diagnoses linked to this encounter.    Floyd Villarreal  reports that he has been smoking cigarettes. He has a 35.00 pack-year smoking history. He has never used smokeless tobacco.. I have educated him on the risk of diseases from using tobacco products.        MEDS ORDERED DURING VISIT:  No orders of the defined types were placed in this encounter.          This document has been electronically signed by LAWSON Finch Jr.  April 16, 2023 23:12 EDT   Transcribed from ambient dictation for LAWSON Finch by Mary Brown.  04/14/23   16:31 EDT    Patient or patient representative verbalized consent to the visit recording.  I have personally performed the services described in this document  as transcribed by the above individual, and it is both accurate and complete.

## 2023-10-04 DIAGNOSIS — I49.3 FREQUENT PVCS: ICD-10-CM

## 2023-10-04 DIAGNOSIS — I10 ESSENTIAL HYPERTENSION: ICD-10-CM

## 2023-10-04 RX ORDER — METOPROLOL SUCCINATE 100 MG/1
TABLET, EXTENDED RELEASE ORAL
Qty: 90 TABLET | Refills: 1 | Status: SHIPPED | OUTPATIENT
Start: 2023-10-04

## 2023-10-04 NOTE — TELEPHONE ENCOUNTER
Name from pharmacy: metoprolol succinate  mg tablet,extended release 24 hr          Will file in chart as: metoprolol succinate XL (TOPROL-XL) 100 MG 24 hr tablet    Sig: take one tablet by mouth once daily    Disp: 30 tablet    Refills: 0    Start: 10/4/2023    Class: Normal    Non-formulary For: Frequent PVCs, Essential hypertension    Last ordered: 11 months ago by LAWSON Finch Last refill: 9/1/2023    Rx #: [id number]    Beta-Blockers Protocol Ppamfd51/04/2023 02:04 PM   Protocol Details BP under 130/80 in past year and patient has diabetes, CAD or PVD    Recent or future visit with authorizing provider

## 2024-02-23 ENCOUNTER — OFFICE VISIT (OUTPATIENT)
Dept: CARDIOLOGY | Facility: CLINIC | Age: 64
End: 2024-02-23
Payer: COMMERCIAL

## 2024-02-23 VITALS
BODY MASS INDEX: 29.48 KG/M2 | OXYGEN SATURATION: 97 % | SYSTOLIC BLOOD PRESSURE: 143 MMHG | WEIGHT: 210.6 LBS | DIASTOLIC BLOOD PRESSURE: 83 MMHG | HEIGHT: 71 IN | HEART RATE: 97 BPM

## 2024-02-23 DIAGNOSIS — I10 ESSENTIAL HYPERTENSION: ICD-10-CM

## 2024-02-23 DIAGNOSIS — E78.5 DYSLIPIDEMIA: ICD-10-CM

## 2024-02-23 DIAGNOSIS — I65.23 BILATERAL CAROTID ARTERY STENOSIS: ICD-10-CM

## 2024-02-23 DIAGNOSIS — I25.10 CORONARY ARTERY DISEASE INVOLVING NATIVE CORONARY ARTERY OF NATIVE HEART WITHOUT ANGINA PECTORIS: Primary | ICD-10-CM

## 2024-02-23 DIAGNOSIS — E11.59 TYPE 2 DIABETES MELLITUS WITH OTHER CIRCULATORY COMPLICATION, WITHOUT LONG-TERM CURRENT USE OF INSULIN: ICD-10-CM

## 2024-02-23 DIAGNOSIS — I73.9 LEFT LEG CLAUDICATION: ICD-10-CM

## 2024-02-23 DIAGNOSIS — R09.89 RIGHT CAROTID BRUIT: ICD-10-CM

## 2024-02-23 DIAGNOSIS — I49.3 FREQUENT PVCS: ICD-10-CM

## 2024-02-23 PROCEDURE — 93000 ELECTROCARDIOGRAM COMPLETE: CPT | Performed by: CLINICAL NURSE SPECIALIST

## 2024-02-23 PROCEDURE — 99214 OFFICE O/P EST MOD 30 MIN: CPT | Performed by: CLINICAL NURSE SPECIALIST

## 2024-02-23 RX ORDER — ROSUVASTATIN CALCIUM 20 MG/1
20 TABLET, COATED ORAL
COMMUNITY
Start: 2024-02-10

## 2024-02-23 RX ORDER — LORATADINE 10 MG/1
10 TABLET ORAL DAILY
COMMUNITY

## 2024-02-23 NOTE — PROGRESS NOTES
"Subjective     Floyd Villarreal is a 63 y.o. male who presents today for Follow-up (Clearance for DOT ).    CHIEF COMPLIANT  Chief Complaint   Patient presents with    Follow-up     Clearance for DOT        Active Problems:  Problem list  1.  Coronary artery disease  1.1 cardiac catheterization February 2018 at hospital in [facility] with 100% occlusion of the proximal RCA status post thrombectomy and stenting x3 nonobstructive LAD and circumflex  1.2 cardiac catheterization June 2019 because of NSTEMI demonstrated high-grade circumflex disease status post stenting of the native circumflex and OM1 with nonobstructive disease otherwise medical management recommended  2.  Preserved systolic function  3.  Hypertension  4.  Dyslipidemia  5.  Diabetes mellitus  6.  Carotid artery stenosis  6.1 nonobstructive carotids by duplex in 2019.  Less than 50% stenosis bilaterally  6.2 carotid duplex 7/20: Nonobstructive carotid artery disease.       HPI  The patient is a 63-year-old male that returns for routine follow-up.  He denies chest pain, shortness of air, syncope, near syncope or palpitations.  Overall from a cardiac standpoint he is doing well.  His only complaint is intermittent cramping in his left lower extremity which he states feels like a \"charley horse.\"  This occurs mostly at night and will wake him from sleep but also will happen from time to time with ambulation.  His blood pressure is 143/83.  He states it typically runs a little lower at home.  He states he has \"whitecoat syndrome.\"  His EKG shows normal sinus rhythm with IVCD.  There is no change from his prior EKG.    PRIOR MEDS  Current Outpatient Medications on File Prior to Visit   Medication Sig Dispense Refill    amitriptyline (ELAVIL) 25 MG tablet Take 1 tablet by mouth Every Night.      aspirin 81 MG EC tablet Take 1 tablet by mouth Daily.      empagliflozin (JARDIANCE) 25 MG tablet tablet Take 1 tablet by mouth Daily.      glimepiride " (AMARYL) 4 MG tablet Take 1 tablet by mouth 2 (Two) Times a Day.      isosorbide mononitrate (IMDUR) 30 MG 24 hr tablet Take 1 tablet by mouth Every Morning. 30 tablet 11    Liraglutide (VICTOZA SC) Inject 1.8 mg under the skin into the appropriate area as directed Daily.      loratadine (CLARITIN) 10 MG tablet Take 1 tablet by mouth Daily.      losartan (COZAAR) 100 MG tablet Take 1 tablet by mouth Daily.      metFORMIN (GLUCOPHAGE) 500 MG tablet Take 1 tablet by mouth 2 (two) times a day. 2 tabs bid      metoprolol succinate XL (TOPROL-XL) 100 MG 24 hr tablet take one tablet by mouth once daily 90 tablet 1    rosuvastatin (CRESTOR) 20 MG tablet Take 1 tablet by mouth Daily With Breakfast.      [DISCONTINUED] atorvastatin (LIPITOR) 80 MG tablet Take 1 tablet by mouth Daily.       No current facility-administered medications on file prior to visit.       ALLERGIES  Patient has no known allergies.    HISTORY  Past Medical History:   Diagnosis Date    Coronary arteriosclerosis after percutaneous transluminal coronary angioplasty (PTCA)     Coronary arteriosclerosis after percutaneous transluminal coronary angioplasty (PTCA)     Coronary artery disease     Diabetes mellitus     Hyperlipidemia     Hypertension     Myocardial infarction     MI 2019     Stroke        Social History     Socioeconomic History    Marital status:    Tobacco Use    Smoking status: Every Day     Packs/day: 1.00     Years: 35.00     Additional pack years: 0.00     Total pack years: 35.00     Types: Cigarettes     Last attempt to quit: 2019     Years since quittin.6    Smokeless tobacco: Never   Substance and Sexual Activity    Alcohol use: No    Drug use: No    Sexual activity: Defer       Family History   Problem Relation Age of Onset    Hypertension Mother     Lung cancer Mother     Hypertension Father     Cancer Father         bladder    Bone cancer Father     Cancer Brother         bladder       Review of Systems  "  Constitutional:  Negative for chills, diaphoresis, fatigue and fever.   HENT: Negative.     Eyes: Negative.  Negative for visual disturbance (glasses).   Respiratory: Negative.  Negative for apnea, cough, chest tightness, shortness of breath and wheezing.    Cardiovascular: Negative.  Negative for chest pain, palpitations and leg swelling.   Gastrointestinal: Negative.  Negative for blood in stool.   Endocrine: Negative.  Negative for cold intolerance and heat intolerance.   Genitourinary: Negative.  Negative for hematuria.   Musculoskeletal:  Positive for arthralgias and myalgias (LT leg gets alexander horse). Negative for back pain, neck pain and neck stiffness.   Skin: Negative.  Negative for color change, rash and wound.   Allergic/Immunologic: Negative.  Negative for environmental allergies and food allergies.   Neurological:  Positive for numbness (fingers). Negative for dizziness, syncope, weakness, light-headedness and headaches.   Hematological:  Bruises/bleeds easily.   Psychiatric/Behavioral:  Positive for sleep disturbance (Alexander horse in LT leg).        Objective     VITALS: /83 (BP Location: Right arm, Patient Position: Sitting)   Pulse 97   Ht 180.3 cm (70.98\")   Wt 95.5 kg (210 lb 9.6 oz)   SpO2 97%   BMI 29.39 kg/m²     LABS:   Lab Results (most recent)       None            IMAGING:   No Images in the past 120 days found..    EXAM:  Physical Exam  Constitutional:       Appearance: Normal appearance.   Eyes:      Pupils: Pupils are equal, round, and reactive to light.   Cardiovascular:      Rate and Rhythm: Normal rate and regular rhythm.      Pulses:           Carotid pulses are 2+ on the right side and 2+ on the left side.       Radial pulses are 2+ on the right side and 2+ on the left side.        Dorsalis pedis pulses are 2+ on the right side and 2+ on the left side.        Posterior tibial pulses are 2+ on the right side and 2+ on the left side.      Heart sounds: Normal heart " sounds.   Pulmonary:      Effort: Pulmonary effort is normal.      Breath sounds: Normal breath sounds.   Abdominal:      General: Bowel sounds are normal.      Palpations: Abdomen is soft.   Musculoskeletal:      Right lower leg: No edema.      Left lower leg: No edema.   Skin:     General: Skin is warm and dry.      Capillary Refill: Capillary refill takes less than 2 seconds.   Neurological:      General: No focal deficit present.      Mental Status: He is alert and oriented to person, place, and time.   Psychiatric:         Mood and Affect: Mood normal.         Thought Content: Thought content normal.         Procedure     ECG 12 Lead    Date/Time: 2/23/2024 9:00 AM  Performed by: Dina Bishop APRN    Authorized by: Dina Bishop APRN  Comparison: compared with previous ECG from 4/14/2023  Rhythm: sinus rhythm  Rate: normal  BPM: 89  Conduction: non-specific intraventricular conduction delay  ST Segments: ST segments normal  T Waves: T waves normal  QRS axis: normal  Comments: Qtc 424ms             Assessment & Plan    Diagnosis Plan   1. Coronary artery disease involving native coronary artery of native heart without angina pectoris  ECG 12 Lead    CBC & Differential      2. Bilateral carotid artery stenosis  Duplex Carotid Ultrasound CAR      3. Right carotid bruit  Duplex Carotid Ultrasound CAR      4. Left leg claudication  Duplex Lower Extremity Art / Grafts - Left CAR      5. Essential hypertension  Comprehensive Metabolic Panel      6. Frequent PVCs        7. Type 2 diabetes mellitus with other circulatory complication, without long-term current use of insulin  Hemoglobin A1c      8. Dyslipidemia  Lipid Panel        Plan:  1.  Coronary artery disease: The patient denies current symptoms of chest pain or worsening shortness of air.  Will continue medical management including aspirin, rosuvastatin, losartan, isosorbide and Toprol.  He was advised to notify our office if any symptoms of chest  pain develop.  From a cardiovascular standpoint he can be cleared for DOT.  2.  Bilateral carotid artery stenosis: He has not had a carotid Doppler since 2020.  He did have a right carotid bruit on exam today.  Will schedule the patient for a carotid Doppler to further evaluate.  3.  Right carotid bruit: Plan as described above.  4.  Left leg claudication: We will schedule the patient for a left lower extremity arterial Doppler to further evaluate for possible PVD.  Will also check a CMP and magnesium.  5.  Essential hypertension: Will continue current medication regimen.  Blood pressure under relatively good control.  The patient was instructed to monitor BP at home and to notify our office if systolic BP is consistently greater than 130-135 systolic.  Goal BP is 130-135/70-80.  6.  Frequent PVCs: The patient does have a history of PVCs.  No PVCs on EKG today.  The patient denies palpitations.  7.  Diabetes mellitus type 2.  The patient states he has not had labs completed in quite some time.  Will check a hemoglobin A1c.  8.  Dyslipidemia: Will continue statin.  Will check a lipid panel and CMP.    Return in about 1 year (around 2/23/2025).    Floyd Villarreal  reports that he has been smoking cigarettes. He has a 35.00 pack-year smoking history. He has never used smokeless tobacco.. I have educated him on the risk of diseases from using tobacco products such as cancer, COPD, and heart disease.     I advised him to quit and he is not willing to quit.    I spent 3  minutes counseling the patient.    Patient brought in medicine list to appointment, it's been reviewed with patient and med list was updated in the chart.          MEDS ORDERED DURING VISIT:  No orders of the defined types were placed in this encounter.      DISCONTINUED MEDS DURING VISIT:   Medications Discontinued During This Encounter   Medication Reason    atorvastatin (LIPITOR) 80 MG tablet Discontinued by another clinician          This document has  been electronically signed by LAWSON Marie  February 23, 2024 09:46 EST    Dictated Utilizing Dragon Dictation: Part of this note may be an electronic transcription/translation of spoken language to printed text using the Dragon Dictation System

## 2024-04-24 ENCOUNTER — TELEPHONE (OUTPATIENT)
Dept: CARDIOLOGY | Facility: CLINIC | Age: 64
End: 2024-04-24
Payer: COMMERCIAL

## 2024-04-24 NOTE — TELEPHONE ENCOUNTER
Working on overdue results, pt is past due for labs as we have not received results or it has not been performed. Labs were ordered on 2/23/24    [facility] for pt.       RELAY    One of our providers has lab orders in for pt, please ask pt to get fasting labs at earliest convenience.

## 2024-05-09 ENCOUNTER — TELEPHONE (OUTPATIENT)
Dept: CARDIOLOGY | Facility: CLINIC | Age: 64
End: 2024-05-09
Payer: COMMERCIAL

## 2024-06-26 ENCOUNTER — TELEPHONE (OUTPATIENT)
Dept: CARDIOLOGY | Facility: CLINIC | Age: 64
End: 2024-06-26
Payer: COMMERCIAL

## 2024-06-26 NOTE — TELEPHONE ENCOUNTER
Working on overdue results, pt is past due for labs as we have not received results or it has not been performed.     Patient will have them completed when he is back in town, he's an .

## 2024-07-17 ENCOUNTER — HOSPITAL ENCOUNTER (OUTPATIENT)
Dept: CARDIOLOGY | Facility: HOSPITAL | Age: 64
Discharge: HOME | End: 2024-07-17
Payer: MEDICAID

## 2024-07-17 DIAGNOSIS — I73.9 LEFT LEG CLAUDICATION: ICD-10-CM

## 2024-07-17 DIAGNOSIS — I65.23 BILATERAL CAROTID ARTERY STENOSIS: ICD-10-CM

## 2024-07-17 DIAGNOSIS — R09.89 RIGHT CAROTID BRUIT: ICD-10-CM

## 2024-07-17 LAB
BH CV LEA LEFT ANT TIBIAL A DISTAL PSV: 55 CM/S
BH CV LEA LEFT CFA PROX PSV: 130 CM/S
BH CV LEA LEFT DFA PROX PSV: 103 CM/S
BH CV LEA LEFT POPITEAL A  PROX PSV: 102 CM/S
BH CV LEA LEFT PTA DISTAL PSV: 65 CM/S
BH CV LEA LEFT SFA DISTAL PSV: -92.7 CM/S
BH CV LEA LEFT SFA MID PSV: -161 CM/S
BH CV LEA LEFT SFA PROX PSV: 97 CM/S
BH CV XLRA MEAS LEFT CAROTID BULB EDV: 22 CM/SEC
BH CV XLRA MEAS LEFT CAROTID BULB PSV: 115 CM/SEC
BH CV XLRA MEAS LEFT DIST CCA EDV: 23 CM/SEC
BH CV XLRA MEAS LEFT DIST CCA PSV: 111 CM/SEC
BH CV XLRA MEAS LEFT DIST ICA EDV: -34.5 CM/SEC
BH CV XLRA MEAS LEFT DIST ICA PSV: -110 CM/SEC
BH CV XLRA MEAS LEFT ICA/CCA RATIO: 1.1
BH CV XLRA MEAS LEFT MID ICA EDV: -30.3 CM/SEC
BH CV XLRA MEAS LEFT MID ICA PSV: -105 CM/SEC
BH CV XLRA MEAS LEFT PROX CCA EDV: 22 CM/SEC
BH CV XLRA MEAS LEFT PROX CCA PSV: 120 CM/SEC
BH CV XLRA MEAS LEFT PROX ECA PSV: -298 CM/SEC
BH CV XLRA MEAS LEFT PROX ICA EDV: -32 CM/SEC
BH CV XLRA MEAS LEFT PROX ICA PSV: -121 CM/SEC
BH CV XLRA MEAS LEFT VERTEBRAL A PSV: 31 CM/SEC
BH CV XLRA MEAS RIGHT CAROTID BULB EDV: 21.7 CM/SEC
BH CV XLRA MEAS RIGHT CAROTID BULB PSV: 80.8 CM/SEC
BH CV XLRA MEAS RIGHT DIST CCA EDV: 16.8 CM/SEC
BH CV XLRA MEAS RIGHT DIST CCA PSV: 78.3 CM/SEC
BH CV XLRA MEAS RIGHT DIST ICA EDV: -39 CM/SEC
BH CV XLRA MEAS RIGHT DIST ICA PSV: -137 CM/SEC
BH CV XLRA MEAS RIGHT ICA/CCA RATIO: 1.9
BH CV XLRA MEAS RIGHT MID ICA EDV: -34.3 CM/SEC
BH CV XLRA MEAS RIGHT MID ICA PSV: -93.9 CM/SEC
BH CV XLRA MEAS RIGHT PROX CCA EDV: 18 CM/SEC
BH CV XLRA MEAS RIGHT PROX CCA PSV: 82.6 CM/SEC
BH CV XLRA MEAS RIGHT PROX ECA PSV: -239 CM/SEC
BH CV XLRA MEAS RIGHT PROX ICA EDV: -31.7 CM/SEC
BH CV XLRA MEAS RIGHT PROX ICA PSV: -145 CM/SEC
BH CV XLRA MEAS RIGHT VERTEBRAL A EDV: 17.3 CM/SEC
BH CV XLRA MEAS RIGHT VERTEBRAL A PSV: 77.1 CM/SEC
LEFT GROIN CFA SYS: 130 CM/SEC

## 2024-07-17 PROCEDURE — 93926 LOWER EXTREMITY STUDY: CPT

## 2024-07-17 PROCEDURE — 93880 EXTRACRANIAL BILAT STUDY: CPT

## 2024-07-27 LAB
BH CV LEA LEFT ANT TIBIAL A DISTAL PSV: 55 CM/S
BH CV LEA LEFT CFA PROX PSV: 130 CM/S
BH CV LEA LEFT DFA PROX PSV: 103 CM/S
BH CV LEA LEFT POPITEAL A  PROX PSV: 102 CM/S
BH CV LEA LEFT PTA DISTAL PSV: 65 CM/S
BH CV LEA LEFT SFA DISTAL PSV: -92.7 CM/S
BH CV LEA LEFT SFA MID PSV: -161 CM/S
BH CV LEA LEFT SFA PROX PSV: -96.6 CM/S
BH CV XLRA MEAS LEFT CAROTID BULB EDV: 22 CM/SEC
BH CV XLRA MEAS LEFT CAROTID BULB PSV: 115 CM/SEC
BH CV XLRA MEAS LEFT DIST CCA EDV: 23 CM/SEC
BH CV XLRA MEAS LEFT DIST CCA PSV: 111 CM/SEC
BH CV XLRA MEAS LEFT DIST ICA EDV: -34.5 CM/SEC
BH CV XLRA MEAS LEFT DIST ICA PSV: -110 CM/SEC
BH CV XLRA MEAS LEFT ICA/CCA RATIO: 1.1
BH CV XLRA MEAS LEFT MID ICA EDV: -30.3 CM/SEC
BH CV XLRA MEAS LEFT MID ICA PSV: -105 CM/SEC
BH CV XLRA MEAS LEFT PROX CCA EDV: 22 CM/SEC
BH CV XLRA MEAS LEFT PROX CCA PSV: 120 CM/SEC
BH CV XLRA MEAS LEFT PROX ECA PSV: -298 CM/SEC
BH CV XLRA MEAS LEFT PROX ICA EDV: -32 CM/SEC
BH CV XLRA MEAS LEFT PROX ICA PSV: -121 CM/SEC
BH CV XLRA MEAS LEFT VERTEBRAL A PSV: 31 CM/SEC
BH CV XLRA MEAS RIGHT CAROTID BULB EDV: 21.7 CM/SEC
BH CV XLRA MEAS RIGHT CAROTID BULB PSV: 80.8 CM/SEC
BH CV XLRA MEAS RIGHT DIST CCA EDV: 16.8 CM/SEC
BH CV XLRA MEAS RIGHT DIST CCA PSV: 78.3 CM/SEC
BH CV XLRA MEAS RIGHT DIST ICA EDV: -39 CM/SEC
BH CV XLRA MEAS RIGHT DIST ICA PSV: -137 CM/SEC
BH CV XLRA MEAS RIGHT ICA/CCA RATIO: 1.9
BH CV XLRA MEAS RIGHT MID ICA EDV: -34.3 CM/SEC
BH CV XLRA MEAS RIGHT MID ICA PSV: -93.9 CM/SEC
BH CV XLRA MEAS RIGHT PROX CCA EDV: 18 CM/SEC
BH CV XLRA MEAS RIGHT PROX CCA PSV: 82.6 CM/SEC
BH CV XLRA MEAS RIGHT PROX ECA PSV: -239 CM/SEC
BH CV XLRA MEAS RIGHT PROX ICA EDV: -31.7 CM/SEC
BH CV XLRA MEAS RIGHT PROX ICA PSV: -145 CM/SEC
BH CV XLRA MEAS RIGHT VERTEBRAL A EDV: 17.3 CM/SEC
BH CV XLRA MEAS RIGHT VERTEBRAL A PSV: 77.1 CM/SEC
LEFT GROIN CFA SYS: 130 CM/SEC

## 2024-07-30 ENCOUNTER — TELEPHONE (OUTPATIENT)
Dept: CARDIOLOGY | Facility: CLINIC | Age: 64
End: 2024-07-30
Payer: MEDICAID

## 2024-07-30 NOTE — TELEPHONE ENCOUNTER
----- Message from Dina Bishop sent at 7/29/2024  9:31 AM EDT -----  No hemodynamically significant stenosis. Continue aspirin and statin.

## 2024-07-30 NOTE — TELEPHONE ENCOUNTER
----- Message from Dina Bishop sent at 7/29/2024  9:32 AM EDT -----  Nonobstructive disease bilaterally.  No further intervention needed at this time.  Continue aspirin and statin.

## 2025-01-13 ENCOUNTER — TELEPHONE (OUTPATIENT)
Dept: CARDIOLOGY | Facility: CLINIC | Age: 65
End: 2025-01-13
Payer: MEDICAID

## 2025-01-13 NOTE — TELEPHONE ENCOUNTER
Caller: Floyd Villarreal    Relationship to patient: Self    Best call back number: [phone]     Chief complaint:   NEEDING TO COME IN FOR DOT PHYSICAL ON 1.20.25 IF POSSIBLE BECAUSE HE IS OFF WORK THAT DAY.    Type of visit: FOLLOW UP     Requested date: 1.20.25     If rescheduling, when is the original appointment: 2.24.25     Additional notes: PLEASE CALL PT IF WE CAN MOVE PT UP TO 1.20.24

## 2025-01-20 ENCOUNTER — TELEPHONE (OUTPATIENT)
Dept: CARDIOLOGY | Facility: CLINIC | Age: 65
End: 2025-01-20

## 2025-01-20 ENCOUNTER — OFFICE VISIT (OUTPATIENT)
Dept: CARDIOLOGY | Facility: CLINIC | Age: 65
End: 2025-01-20
Payer: MEDICAID

## 2025-01-20 VITALS
WEIGHT: 195.6 LBS | DIASTOLIC BLOOD PRESSURE: 86 MMHG | OXYGEN SATURATION: 98 % | HEART RATE: 96 BPM | SYSTOLIC BLOOD PRESSURE: 137 MMHG | HEIGHT: 71 IN | BODY MASS INDEX: 27.38 KG/M2

## 2025-01-20 DIAGNOSIS — I10 ESSENTIAL HYPERTENSION: ICD-10-CM

## 2025-01-20 DIAGNOSIS — Z01.810 PREOPERATIVE CARDIOVASCULAR EXAMINATION: ICD-10-CM

## 2025-01-20 DIAGNOSIS — E78.5 DYSLIPIDEMIA: ICD-10-CM

## 2025-01-20 DIAGNOSIS — I25.10 CORONARY ARTERY DISEASE INVOLVING NATIVE CORONARY ARTERY OF NATIVE HEART WITHOUT ANGINA PECTORIS: Primary | ICD-10-CM

## 2025-01-20 PROCEDURE — 3079F DIAST BP 80-89 MM HG: CPT | Performed by: CLINICAL NURSE SPECIALIST

## 2025-01-20 PROCEDURE — 3075F SYST BP GE 130 - 139MM HG: CPT | Performed by: CLINICAL NURSE SPECIALIST

## 2025-01-20 PROCEDURE — 99214 OFFICE O/P EST MOD 30 MIN: CPT | Performed by: CLINICAL NURSE SPECIALIST

## 2025-01-20 PROCEDURE — 93000 ELECTROCARDIOGRAM COMPLETE: CPT | Performed by: CLINICAL NURSE SPECIALIST

## 2025-01-20 RX ORDER — SEMAGLUTIDE 2.68 MG/ML
2 INJECTION, SOLUTION SUBCUTANEOUS WEEKLY
COMMUNITY
Start: 2024-12-16

## 2025-01-20 NOTE — TELEPHONE ENCOUNTER
Caller: Floyd Villarreal    Relationship to patient: Self    Best call back number: [phone] (home)     Chief complaint: NONE    Type of visit: F/U    Requested date: LAST TWO WEEKS FEB 2025. NEEDS FOR DOT PHYSICAL     If rescheduling, when is the original appointment: 1.20.25 CANCELED BY OFFICE DUE TO WEATHER.    Additional notes: NO AVAILABILITY UNTIL 4.29.25 FOR HUB. PLEASE CALL PT TO R/S APPT.

## 2025-01-20 NOTE — PROGRESS NOTES
Subjective     Floyd Villarreal is a 64 y.o. male who presents today for Follow-up (DOT).    CHIEF COMPLIANT  Chief Complaint   Patient presents with    Follow-up     DOT       Active Problems:  1.  Coronary artery disease  1.1 cardiac catheterization February 2018 at hospital in [facility] with 100% occlusion of the proximal RCA status post thrombectomy and stenting x3 nonobstructive LAD and circumflex  1.2 cardiac catheterization June 2019 because of NSTEMI demonstrated high-grade circumflex disease status post stenting of the native circumflex and OM1 with nonobstructive disease otherwise medical management recommended  2.  Preserved systolic function  3.  Hypertension  4.  Dyslipidemia  5.  Diabetes mellitus  6.  Carotid artery stenosis  6.1 nonobstructive carotids by duplex in 2019.  Less than 50% stenosis bilaterally  6.2 carotid duplex 7/20: Nonobstructive carotid artery disease.    HPI  The patient is a 64-year-old male that returns for follow-up.  He denies chest pain, worsening dyspnea, syncope, near syncope or palpitations.  The patient does state that he was having left arm and hand numbness and some numbness on his left lower face and neck.  He states he had a workup including a CT of his head to rule out stroke.  He states that he was told all of that was negative.  He ended up undergoing a CT of his neck and states he has been referred to a neurosurgeon.  The patient states he has been told that he will likely need surgery on his neck.  His EKG today shows normal sinus rhythm with IVCD and nonspecific ST changes.  This is unchanged from his prior EKG.    PRIOR MEDS  Current Outpatient Medications on File Prior to Visit   Medication Sig Dispense Refill    amitriptyline (ELAVIL) 25 MG tablet Take 1 tablet by mouth Every Night.      aspirin 81 MG EC tablet Take 1 tablet by mouth Daily.      empagliflozin (JARDIANCE) 25 MG tablet tablet Take 1 tablet by mouth Daily.      glimepiride (AMARYL) 4 MG  tablet Take 1 tablet by mouth 2 (Two) Times a Day.      isosorbide mononitrate (IMDUR) 30 MG 24 hr tablet Take 1 tablet by mouth Every Morning. 30 tablet 11    Liraglutide (VICTOZA SC) Inject 1.8 mg under the skin into the appropriate area as directed Daily.      loratadine (CLARITIN) 10 MG tablet Take 1 tablet by mouth Daily.      losartan (COZAAR) 100 MG tablet Take 1 tablet by mouth Daily.      metFORMIN (GLUCOPHAGE) 500 MG tablet Take 1 tablet by mouth 2 (two) times a day. 2 tabs bid      metoprolol succinate XL (TOPROL-XL) 100 MG 24 hr tablet take one tablet by mouth once daily 90 tablet 1    Ozempic, 2 MG/DOSE, 8 MG/3ML solution pen-injector Inject 2 mg under the skin into the appropriate area as directed 1 (One) Time Per Week.      rosuvastatin (CRESTOR) 20 MG tablet Take 1 tablet by mouth Daily With Breakfast.       No current facility-administered medications on file prior to visit.       ALLERGIES  Patient has no known allergies.    HISTORY  Past Medical History:   Diagnosis Date    Coronary arteriosclerosis after percutaneous transluminal coronary angioplasty (PTCA)     Coronary arteriosclerosis after percutaneous transluminal coronary angioplasty (PTCA)     Coronary artery disease     Diabetes mellitus     Hyperlipidemia     Hypertension     Myocardial infarction     MI 2019     Stroke        Social History     Socioeconomic History    Marital status:    Tobacco Use    Smoking status: Every Day     Current packs/day: 0.00     Average packs/day: 1 pack/day for 35.0 years (35.0 ttl pk-yrs)     Types: Cigarettes     Start date: 1984     Last attempt to quit: 2019     Years since quittin.5    Smokeless tobacco: Never   Substance and Sexual Activity    Alcohol use: No    Drug use: No    Sexual activity: Defer       Family History   Problem Relation Age of Onset    Hypertension Mother     Lung cancer Mother     Hypertension Father     Cancer Father         bladder    Bone cancer Father   "   Cancer Brother         bladder       Review of Systems   Constitutional:  Negative for fatigue.   Eyes:  Positive for visual disturbance (Glasses daily).   Respiratory:  Negative for chest tightness and shortness of breath.    Cardiovascular:  Negative for chest pain, palpitations and leg swelling.   Neurological:  Positive for numbness (Left side of face down to arm- states he was told he has a pinched nerve). Negative for dizziness, syncope, weakness and headaches.        Appt w/ neurosurgeon 2/20/25. Left hand is drawled up.    Hematological:  Does not bruise/bleed easily.   Psychiatric/Behavioral:  Negative for sleep disturbance.        Objective     VITALS: /86   Pulse 96   Ht 180.3 cm (71\")   Wt 88.7 kg (195 lb 9.6 oz)   SpO2 98%   BMI 27.28 kg/m²     LABS:   Lab Results (most recent)       None            IMAGING:   No Images in the past 120 days found..    EXAM:  Physical Exam  Constitutional:       Appearance: Normal appearance.   Eyes:      Pupils: Pupils are equal, round, and reactive to light.   Cardiovascular:      Rate and Rhythm: Normal rate and regular rhythm.      Pulses:           Carotid pulses are 2+ on the right side with bruit and 2+ on the left side.       Radial pulses are 2+ on the right side and 2+ on the left side.        Dorsalis pedis pulses are 2+ on the right side and 2+ on the left side.        Posterior tibial pulses are 2+ on the right side and 2+ on the left side.      Heart sounds: Normal heart sounds.   Pulmonary:      Effort: Pulmonary effort is normal.      Breath sounds: Normal breath sounds.   Abdominal:      General: Bowel sounds are normal.      Palpations: Abdomen is soft.   Musculoskeletal:      Right lower leg: No edema.      Left lower leg: No edema.   Skin:     General: Skin is warm and dry.      Capillary Refill: Capillary refill takes less than 2 seconds.   Neurological:      Mental Status: He is alert and oriented to person, place, and time.      " Comments: Numbness and weakness LUE    Psychiatric:         Mood and Affect: Mood normal.         Thought Content: Thought content normal.         Procedure     ECG 12 Lead    Date/Time: 1/20/2025 2:04 PM  Performed by: Dina Bishop APRN    Authorized by: Dina Bishop APRN  Comparison: compared with previous ECG from 2/23/2024  Similar to previous ECG  Rhythm: sinus rhythm  Rate: normal  BPM: 99  Conduction: non-specific intraventricular conduction delay  QRS axis: normal  Other findings: non-specific ST-T wave changes             Assessment & Plan    Diagnosis Plan   1. Coronary artery disease involving native coronary artery of native heart without angina pectoris  ECG 12 Lead    Adult Transthoracic Echo Complete W/ Cont if Necessary Per Protocol    Stress Test With Myocardial Perfusion One Day      2. Preoperative cardiovascular examination  ECG 12 Lead    Adult Transthoracic Echo Complete W/ Cont if Necessary Per Protocol    Stress Test With Myocardial Perfusion One Day      3. Essential hypertension        4. Dyslipidemia          Tony:  1.  CAD: Continue medical management including aspirin, beta-blocker, losartan, statin and isosorbide.  The patient denies current chest pain symptoms or worsening dyspnea.  He was advised to notify our office if symptoms develop.  2.  Preoperative cardiovascular examination: The patient has been referred to neurosurgery in will likely be scheduled for cervical spine surgery in the upcoming weeks.  He has not had any cardiovascular testing since 2019.  Will schedule him for a nuclear stress test and an echocardiogram.  3.  Essential hypertension: Continue current antihypertensives.  The patient was instructed to monitor BP at home and to notify our office if systolic BP is consistently greater than 130-135 systolic.  Goal BP is 130-135/70-80.  4.  Dyslipidemia: Continue rosuvastatin.  Will periodically review lipid panel.  Return in about 6 months (around  7/20/2025).    Floyd Villarreal  reports that he has been smoking cigarettes. He started smoking about 40 years ago. He has a 35 pack-year smoking history. He has never used smokeless tobacco. I have educated him on the risk of diseases from using tobacco products such as cancer, COPD, and heart disease.     I advised him to quit and he is not willing to quit.        BMI is >= 25 and <30. (Overweight) The following options were offered after discussion;: referral to primary care           MEDS ORDERED DURING VISIT:  No orders of the defined types were placed in this encounter.      DISCONTINUED MEDS DURING VISIT:   There are no discontinued medications.       This document has been electronically signed by LAWSON Marie  January 20, 2025 16:40 EST    Dictated Utilizing Dragon Dictation: Part of this note may be an electronic transcription/translation of spoken language to printed text using the Dragon Dictation System

## 2025-02-24 ENCOUNTER — HOSPITAL ENCOUNTER (OUTPATIENT)
Dept: CARDIOLOGY | Facility: HOSPITAL | Age: 65
Discharge: HOME | End: 2025-02-24
Payer: MEDICAID

## 2025-02-24 VITALS — WEIGHT: 195.55 LBS | BODY MASS INDEX: 27.38 KG/M2 | HEIGHT: 71 IN

## 2025-02-24 DIAGNOSIS — Z01.810 PREOPERATIVE CARDIOVASCULAR EXAMINATION: ICD-10-CM

## 2025-02-24 DIAGNOSIS — I25.10 CORONARY ARTERY DISEASE INVOLVING NATIVE CORONARY ARTERY OF NATIVE HEART WITHOUT ANGINA PECTORIS: ICD-10-CM

## 2025-02-24 PROCEDURE — 93016 CV STRESS TEST SUPVJ ONLY: CPT | Performed by: INTERNAL MEDICINE

## 2025-02-24 PROCEDURE — 34310000005 TECHNETIUM SESTAMIBI: Performed by: INTERNAL MEDICINE

## 2025-02-24 PROCEDURE — 78452 HT MUSCLE IMAGE SPECT MULT: CPT | Performed by: INTERNAL MEDICINE

## 2025-02-24 PROCEDURE — 93018 CV STRESS TEST I&R ONLY: CPT | Performed by: INTERNAL MEDICINE

## 2025-02-24 PROCEDURE — 93306 TTE W/DOPPLER COMPLETE: CPT

## 2025-02-24 PROCEDURE — 25010000002 REGADENOSON 0.4 MG/5ML SOLUTION: Performed by: INTERNAL MEDICINE

## 2025-02-24 PROCEDURE — A9500 TC99M SESTAMIBI: HCPCS | Performed by: INTERNAL MEDICINE

## 2025-02-24 PROCEDURE — 93306 TTE W/DOPPLER COMPLETE: CPT | Performed by: INTERNAL MEDICINE

## 2025-02-24 PROCEDURE — 78452 HT MUSCLE IMAGE SPECT MULT: CPT

## 2025-02-24 PROCEDURE — 93017 CV STRESS TEST TRACING ONLY: CPT

## 2025-02-24 RX ORDER — REGADENOSON 0.08 MG/ML
0.4 INJECTION, SOLUTION INTRAVENOUS
Status: COMPLETED | OUTPATIENT
Start: 2025-02-24 | End: 2025-02-24

## 2025-02-24 RX ADMIN — REGADENOSON 0.4 MG: 0.08 INJECTION, SOLUTION INTRAVENOUS at 10:39

## 2025-02-24 RX ADMIN — TECHNETIUM TC 99M SESTAMIBI 1 DOSE: 1 INJECTION INTRAVENOUS at 09:30

## 2025-02-24 RX ADMIN — TECHNETIUM TC 99M SESTAMIBI 1 DOSE: 1 INJECTION INTRAVENOUS at 10:39

## 2025-02-25 LAB
AORTIC DIMENSIONLESS INDEX: 1 (DI)
AV MEAN PRESS GRAD SYS DOP V1V2: 1.34 MMHG
AV VMAX SYS DOP: 75.3 CM/SEC
BH CV ECHO MEAS - ACS: 2.07 CM
BH CV ECHO MEAS - AO MAX PG: 2.27 MMHG
BH CV ECHO MEAS - AO ROOT DIAM: 3.6 CM
BH CV ECHO MEAS - AO V2 VTI: 14.9 CM
BH CV ECHO MEAS - EDV(CUBED): 191.2 ML
BH CV ECHO MEAS - ESV(CUBED): 81.1 ML
BH CV ECHO MEAS - FS: 24.9 %
BH CV ECHO MEAS - IVS/LVPW: 0.85 CM
BH CV ECHO MEAS - IVSD: 1 CM
BH CV ECHO MEAS - LA DIMENSION: 4.3 CM
BH CV ECHO MEAS - LAT PEAK E' VEL: 11.7 CM/SEC
BH CV ECHO MEAS - LV MASS(C)D: 258.5 GRAMS
BH CV ECHO MEAS - LV MAX PG: 1.5 MMHG
BH CV ECHO MEAS - LV MEAN PG: 0.72 MMHG
BH CV ECHO MEAS - LV V1 MAX: 61.3 CM/SEC
BH CV ECHO MEAS - LV V1 VTI: 15.4 CM
BH CV ECHO MEAS - LVIDD: 5.8 CM
BH CV ECHO MEAS - LVIDS: 4.3 CM
BH CV ECHO MEAS - LVPWD: 1.18 CM
BH CV ECHO MEAS - MED PEAK E' VEL: 9.3 CM/SEC
BH CV ECHO MEAS - MV A MAX VEL: 45.1 CM/SEC
BH CV ECHO MEAS - MV DEC SLOPE: 548.9 CM/SEC2
BH CV ECHO MEAS - MV DEC TIME: 0.17 SEC
BH CV ECHO MEAS - MV E MAX VEL: 104 CM/SEC
BH CV ECHO MEAS - MV E/A: 2.3
BH CV ECHO MEAS - MV MAX PG: 4 MMHG
BH CV ECHO MEAS - MV MEAN PG: 1.28 MMHG
BH CV ECHO MEAS - MV P1/2T: 61.3 MSEC
BH CV ECHO MEAS - MV V2 VTI: 16.2 CM
BH CV ECHO MEAS - MVA(P1/2T): 3.6 CM2
BH CV ECHO MEAS - PA V2 MAX: 77.3 CM/SEC
BH CV ECHO MEAS - RV MAX PG: 1.41 MMHG
BH CV ECHO MEAS - RV V1 MAX: 59.5 CM/SEC
BH CV ECHO MEAS - RV V1 VTI: 13.1 CM
BH CV ECHO MEAS - RVDD: 2.9 CM
BH CV ECHO MEAS - TAPSE (>1.6): 2.5 CM
BH CV ECHO MEASUREMENTS AVERAGE E/E' RATIO: 9.9
BH CV REST NUCLEAR ISOTOPE DOSE: 10 MCI
BH CV STRESS COMMENTS STAGE 1: NORMAL
BH CV STRESS DOSE REGADENOSON STAGE 1: 0.4
BH CV STRESS DURATION MIN STAGE 1: 0
BH CV STRESS DURATION SEC STAGE 1: 10
BH CV STRESS NUCLEAR ISOTOPE DOSE: 30 MCI
BH CV STRESS PROTOCOL 1: NORMAL
BH CV STRESS RECOVERY BP: NORMAL MMHG
BH CV STRESS RECOVERY HR: 102 BPM
BH CV STRESS STAGE 1: 1
BH CV XLRA - TDI S': 12.9 CM/SEC
LV EF 3D SEGMENTATION: 37 %
MAXIMAL PREDICTED HEART RATE: 158 BPM
PERCENT MAX PREDICTED HR: 68.35 %
SINUS: 3.7 CM
STRESS BASELINE BP: NORMAL MMHG
STRESS BASELINE HR: 98 BPM
STRESS PERCENT HR: 80 %
STRESS POST ESTIMATED WORKLOAD: 1 METS
STRESS POST EXERCISE DUR MIN: 2 MIN
STRESS POST PEAK BP: NORMAL MMHG
STRESS POST PEAK HR: 108 BPM
STRESS TARGET HR: 134 BPM

## 2025-02-28 NOTE — PROGRESS NOTES
EF significantly low.  He needs to be seen in the office next week.  Can be with another provider if needed.

## 2025-03-03 ENCOUNTER — TELEPHONE (OUTPATIENT)
Dept: CARDIOLOGY | Facility: CLINIC | Age: 65
End: 2025-03-03
Payer: MEDICAID

## 2025-03-03 NOTE — TELEPHONE ENCOUNTER
First attempt to reach pt. VM is full.       RELAY      Please transfer pt to triage as he has abnormal results and we want to get him in for a sooner appt. Thank you!

## 2025-03-03 NOTE — TELEPHONE ENCOUNTER
ECHO:  ----- Message -----   From: Dina Bishop APRN   Sent: 2/28/2025   1:09 PM EST   To: Emely Lin     EF is very low at 25-30%.  He needs follow up within a week to discuss.     STRESS:----- Message -----  From: Dina Bishop APRN  Sent: 2/28/2025   1:10 PM EST  To: Emely Lin    EF significantly low.  He needs to be seen in the office next week.  Can be with another provider if needed.

## 2025-03-04 ENCOUNTER — TELEPHONE (OUTPATIENT)
Dept: CARDIOLOGY | Facility: CLINIC | Age: 65
End: 2025-03-04
Payer: MEDICAID

## 2025-03-04 NOTE — TELEPHONE ENCOUNTER
Caller: [facility]    Relationship: Other    Best call back number: [phone]    [facility]    What test was performed: STRESS TEST    When was the test performed: 2.25.25    Where was the test performed: OFFICE    Additional notes: PLEASE FAX [phone]

## 2025-03-05 ENCOUNTER — TELEPHONE (OUTPATIENT)
Dept: CARDIOLOGY | Facility: CLINIC | Age: 65
End: 2025-03-05
Payer: MEDICAID

## 2025-03-05 RX ORDER — SACUBITRIL AND VALSARTAN 24; 26 MG/1; MG/1
1 TABLET, FILM COATED ORAL 2 TIMES DAILY
Qty: 60 TABLET | Refills: 11 | Status: SHIPPED | OUTPATIENT
Start: 2025-03-05

## 2025-03-05 NOTE — TELEPHONE ENCOUNTER
Patient returned missed call. He was given stress test results and recommendations. Tried to schedule appointment this week however he is a  and currently in [place]. He will be back in 2 weeks on a Saturday and heading back out on Sunday. Explained the importance of him being seen and office closed on Saturday. Advised we can work him in on any day he is back in town. 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 Placed patient in hold to speak with Dina PATHAK to see if at all possible he can be a televisit or meds changed over the phone. She ordered Entresto 24-26 mg BID and to discontinue Losartan however patient will have to be seen in office to discuss testing as cath will need ordered and discuss LifeVest.   Call transferred to Raquel CORNELIUS to see about scheduling patient as I am unable to overbook.     Stress  LAWSON Lo  2/28/2025  1:10 PM EST       EF significantly low.  He needs to be seen in the office next week.  Can be with another provider if needed.       Echo  LAWSON Lo  2/28/2025  1:09 PM EST       EF is very low at 25-30%.  He needs follow up within a week to discuss.       Stress Interpretation Summary    1.  Baseline EKG demonstrated a sinus mechanism at 100 bpm with first-degree AV block and possible anteroseptal wall MI age undetermined.  There was motion artifact as well.     2.  No scintigraphic evidence of ischemia.  There was a dense fixed defect involving the inferobasilar segment compatible with infarct or diaphragmatic attenuation.     3.  Profoundly depressed post stress ejection fraction of 13% with profound hypokinesis to akinesis of all segments.     4.  No evidence of transient ischemic dilation or increased LV filling pressures.      Echo nterpretation Summary    Physician interpretation.  Technically limited study due to suboptimal acoustic windows particularly involved parasternal images.     1.  The left  ventricle is moderately dilated and global LV systolic function severely depressed.  Visually estimated ejection fraction is 25 to 30% with global hypokinesis.  Mild concentric left ventricular hypertrophy with grade 2 diastolic dysfunction.  Mild biatrial enlargement.  RV size and function are grossly preserved.  No septal defect or intracavitary mass or thrombus.     2.  Valves are morphologically normal with no stenotic lesions or valve associated masses or thrombi.  There is trivial MR.     3.  There is a trivial lateral pericardial effusion with no evidence of tamponade physiology on limited Doppler sampling.  The aortic root is at the upper limits of normal with no dissection or aneurysm.     4.  Right heart pressures cannot be calculated.

## 2025-03-05 NOTE — TELEPHONE ENCOUNTER
Elizabeth Deal called patient to notify of abn stress and echo results. Patient refused an appointment with elizabeth at the time of discussion. She asked me to speak to patient to see if I could get him to schedule an appointment. I readdress the issues with the testing being abnormal and the importance of a sooner appointment to discuss the next steps in his care. The patient stated that he was a  and could not come home until a Saturday. 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 I did explain that I understood that he had to work but also needed to think about his safe and the safety of others. He didn't really respond to anything I was saying. I did convince him to schedule an appointment for 03/17/2025. Patient states he will try to make this appointment. He understand the importance to keep this appointment. He was made aware to seek emergency care if he becomes symptomatic at any time.

## 2025-03-18 ENCOUNTER — TELEPHONE (OUTPATIENT)
Dept: CARDIOLOGY | Facility: CLINIC | Age: 65
End: 2025-03-18

## 2025-03-18 ENCOUNTER — OFFICE VISIT (OUTPATIENT)
Dept: CARDIOLOGY | Facility: CLINIC | Age: 65
End: 2025-03-18
Payer: COMMERCIAL

## 2025-03-18 VITALS
OXYGEN SATURATION: 93 % | DIASTOLIC BLOOD PRESSURE: 78 MMHG | WEIGHT: 194.6 LBS | HEART RATE: 103 BPM | BODY MASS INDEX: 27.24 KG/M2 | SYSTOLIC BLOOD PRESSURE: 132 MMHG | HEIGHT: 71 IN

## 2025-03-18 DIAGNOSIS — E78.5 DYSLIPIDEMIA: ICD-10-CM

## 2025-03-18 DIAGNOSIS — I10 ESSENTIAL HYPERTENSION: ICD-10-CM

## 2025-03-18 DIAGNOSIS — I49.3 FREQUENT PVCS: ICD-10-CM

## 2025-03-18 DIAGNOSIS — I65.23 BILATERAL CAROTID ARTERY STENOSIS: ICD-10-CM

## 2025-03-18 DIAGNOSIS — I25.10 CORONARY ARTERY DISEASE INVOLVING NATIVE CORONARY ARTERY OF NATIVE HEART WITHOUT ANGINA PECTORIS: ICD-10-CM

## 2025-03-18 DIAGNOSIS — I50.21 ACUTE HFREF (HEART FAILURE WITH REDUCED EJECTION FRACTION): Primary | ICD-10-CM

## 2025-03-18 RX ORDER — SPIRONOLACTONE 25 MG/1
25 TABLET ORAL DAILY
Qty: 30 TABLET | Refills: 6 | Status: SHIPPED | OUTPATIENT
Start: 2025-03-18

## 2025-03-18 NOTE — TELEPHONE ENCOUNTER
Jocelyn Jolly (nurse practitioner) called in regards to this patient and needs a call back from  at [phone]. 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

## 2025-03-18 NOTE — PROGRESS NOTES
Subjective     Floyd Villarreal is a 64 y.o. male who presents to day for Follow-up (Abn stress and echo ) and Coronary Artery Disease.    CHIEF COMPLIANT  Chief Complaint   Patient presents with    Follow-up     Abn stress and echo     Coronary Artery Disease       Active Problems:  Problem List Items Addressed This Visit          Cardiac and Vasculature    Stenosis of carotid artery    Relevant Orders    [facility]    Comprehensive Metabolic Panel    CBC & Differential    [facility] Wearable Cardioverter-Defibrillator    Coronary artery disease involving native coronary artery of native heart without angina pectoris    Relevant Orders    [facility]    Comprehensive Metabolic Panel    CBC & Differential    [facility] Wearable Cardioverter-Defibrillator    Essential hypertension    Relevant Medications    spironolactone (ALDACTONE) 25 MG tablet    Other Relevant Orders    [facility]    Comprehensive Metabolic Panel    CBC & Differential    [facility] Wearable Cardioverter-Defibrillator     Other Visit Diagnoses         Acute HFrEF (heart failure with reduced ejection fraction)    -  Primary    Relevant Medications    spironolactone (ALDACTONE) 25 MG tablet    Other Relevant Orders    [facility]    Comprehensive Metabolic Panel    CBC & Differential    [facility] Wearable Cardioverter-Defibrillator      Dyslipidemia        Relevant Orders    [facility]    Comprehensive Metabolic Panel    CBC & Differential    [facility] Wearable Cardioverter-Defibrillator      Frequent PVCs        Relevant Orders    [facility]    Comprehensive Metabolic Panel    CBC & Differential    [facility] Wearable Cardioverter-Defibrillator        Active Problems:  1.  Coronary artery disease  1.1 cardiac catheterization February 2018 at hospital in [facility] with 100% occlusion of the proximal RCA status post thrombectomy and stenting x3 nonobstructive LAD and circumflex  1.2 cardiac  catheterization June 2019 because of NSTEMI demonstrated high-grade circumflex disease status post stenting of the native circumflex and OM1 with nonobstructive disease otherwise medical management recommended  2.  Preserved systolic function  3.  Hypertension  3.1 echocardiogram 2/25: EF 25 to 30%, grade 2 diastolic dysfunction, trivial MR  4.  Dyslipidemia  5.  Diabetes mellitus  6.  Carotid artery stenosis  6.1 nonobstructive carotids by duplex in 2019.  Less than 50% stenosis bilaterally  6.2 carotid duplex 7/20: Nonobstructive carotid artery disease.    HPI  HPI  Floyd Villarreal is a 64-year-old male patient for who presents for follow-up due to coronary artery disease and recent stress test and echocardiogram..    Patient did go under ischemic evaluation that identified an ejection fraction of 25 to 30% with global hypokinesis, grade 2 diastolic dysfunction, trivial MR trivial lateral pericardial effusion with no evidence of tamponade physiology.  Patient also had a stress test that showed no scintigraphic evidence of ischemia.  There was a fixed defect involving the inferior basilar segment compatible with infarct or diaphragmatic attenuation.  Profoundly depressed post-rest ejection fraction 13% profound hypokinesis to akinesis of all segments.    He does have a significant history of coronary artery disease in which he is on antianginal therapy, isosorbide, high intensity statin therapy of atorvastatin, and antiplatelet therapy of aspirin. He adds that he previously had stents placed.  In 2019 he received 4 stents to the circumflex and in 2018 he received 3 stents to the RCA.    Patient does have chronic arterial hypertension and which she is on metoprolol, losartan, and isosorbide.  Today's blood pressure is 132/78 heart rate of 103.    Patient does have heart failure with reduced ejection fraction in which she is on Entresto, metoprolol succinate, and Jardiance.  Reviewing back to patient's previous test.   Patient has ejection fraction of 50% in 2019, 55% in 2018.    PRIOR MEDS  Current Outpatient Medications on File Prior to Visit   Medication Sig Dispense Refill    amitriptyline (ELAVIL) 25 MG tablet Take 1 tablet by mouth Every Night.      aspirin 81 MG EC tablet Take 1 tablet by mouth Daily.      empagliflozin (JARDIANCE) 25 MG tablet tablet Take 1 tablet by mouth Daily.      glimepiride (AMARYL) 4 MG tablet Take 1 tablet by mouth 2 (Two) Times a Day.      isosorbide mononitrate (IMDUR) 30 MG 24 hr tablet Take 1 tablet by mouth Every Morning. 30 tablet 11    Liraglutide (VICTOZA SC) Inject 1.8 mg under the skin into the appropriate area as directed Daily.      loratadine (CLARITIN) 10 MG tablet Take 1 tablet by mouth Daily.      metFORMIN (GLUCOPHAGE) 500 MG tablet Take 1 tablet by mouth 2 (two) times a day. 2 tabs bid      metoprolol succinate XL (TOPROL-XL) 100 MG 24 hr tablet take one tablet by mouth once daily 90 tablet 1    Ozempic, 2 MG/DOSE, 8 MG/3ML solution pen-injector Inject 2 mg under the skin into the appropriate area as directed 1 (One) Time Per Week.      rosuvastatin (CRESTOR) 20 MG tablet Take 1 tablet by mouth Daily With Breakfast.      sacubitril-valsartan (ENTRESTO) 24-26 MG tablet Take 1 tablet by mouth 2 (Two) Times a Day. Discontinue Losartan 60 tablet 11     No current facility-administered medications on file prior to visit.       ALLERGIES  Patient has no known allergies.    HISTORY  Past Medical History:   Diagnosis Date    Coronary arteriosclerosis after percutaneous transluminal coronary angioplasty (PTCA)     Coronary arteriosclerosis after percutaneous transluminal coronary angioplasty (PTCA)     Coronary artery disease     Diabetes mellitus     Hyperlipidemia     Hypertension     Myocardial infarction     MI 6/2019     Stroke        Social History     Socioeconomic History    Marital status:    Tobacco Use    Smoking status: Every Day     Current packs/day: 0.00     Average  "packs/day: 1 pack/day for 35.0 years (35.0 ttl pk-yrs)     Types: Cigarettes     Start date: 1984     Last attempt to quit: 2019     Years since quittin.7    Smokeless tobacco: Never   Substance and Sexual Activity    Alcohol use: No    Drug use: No    Sexual activity: Defer       Family History   Problem Relation Age of Onset    Hypertension Mother     Lung cancer Mother     Hypertension Father     Cancer Father         bladder    Bone cancer Father     Cancer Brother         bladder       Review of Systems   Constitutional:  Positive for fatigue.   Respiratory:  Negative for chest tightness and shortness of breath.    Cardiovascular:  Negative for chest pain, palpitations and leg swelling.   Neurological:  Positive for numbness (left arm). Negative for dizziness, syncope, weakness and headaches.   Hematological:  Does not bruise/bleed easily.   Psychiatric/Behavioral:  Positive for sleep disturbance (up every 2 hrs to go to the bathroom).        Objective     VITALS: /78   Pulse 103   Ht 180 cm (70.87\")   Wt 88.3 kg (194 lb 9.6 oz)   SpO2 93%   BMI 27.24 kg/m²     LABS:   Lab Results (most recent)       None            IMAGING:   No Images in the past 120 days found..    EXAM:  Physical Exam    Procedure   Procedures       Assessment & Plan    Diagnosis Plan   1. Acute HFrEF (heart failure with reduced ejection fraction)  [facility]    Comprehensive Metabolic Panel    CBC & Differential    spironolactone (ALDACTONE) 25 MG tablet    [facility] Wearable Cardioverter-Defibrillator      2. Essential hypertension  [facility]    Comprehensive Metabolic Panel    CBC & Differential     [facility] Wearable Cardioverter-Defibrillator      3. Dyslipidemia  [facility]    Comprehensive Metabolic Panel    CBC & Differential     [facility] Wearable Cardioverter-Defibrillator      4. Bilateral carotid artery stenosis  [facility]    Comprehensive Metabolic Panel    CBC & " Differential     [facility] Wearable Cardioverter-Defibrillator      5. Frequent PVCs  [facility]    Comprehensive Metabolic Panel    CBC & Differential     [facility] Wearable Cardioverter-Defibrillator      6. Coronary artery disease involving native coronary artery of native heart without angina pectoris  [facility]    Comprehensive Metabolic Panel    CBC & Differential     [facility] Wearable Cardioverter-Defibrillator      1.  It does look as if patient has acute heart failure with reduced ejection fraction.  Most recent ejection fraction was ranged between 50 to 55% since 2018.  However given his most recent testing his echocardiogram showed ejection fraction of 25 to 30% and his post stress ejection fraction was 13%.The stress test did show a  fixed defect involving the inferior basilar segment compatible with infarct or diaphragmatic attenuation.  Therefore we did discuss repeating left heart catheterization to look for ischemia as a causative factor of his significantly reduced ejection fractions.  Patient has opted to move forth with a left heart catheterization.  Benefits and risk of the heart catheterization was explained and patient wishes to continue.    We will also have patient have labs drawn proximately 1 week prior to left heart catheterization for further risk stratification  2.  Also would like patient fitted for a LifeVest due to a critically depressed ejection fraction of 25 to 30% on echo and 13% poststress.  He is in agreements to move forth.  3.  Due to patient's heart failure with reduced ejection fraction we will continue the Entresto, metoprolol, and Jardiance.  We will add spironolactone as well to maximize treatment.  4.  Patient's blood pressure is controlled on current blood pressure medication regimen.  No medication changes are warranted at this time.  Patient advised to monitor blood pressure on a daily basis and report any persistent highs or lows.  Set goal blood pressure  for patient at 130/80 or below.  5.  Informed of signs and symptoms of ACS and advised to seek emergent treatment for any new worsening symptoms.  Patient also advised sooner follow-up as needed.  Also advised to follow-up with family doctor as needed  This note is dictated utilizing voice recognition software.  Although this record has been proof read, transcriptional errors may still be present. If questions occur regarding the content of this record please do not hesitate to call our office.  I have reviewed and confirmed the accuracy of the ROS as documented by the MA/LPN/RN LAWSON Finch    Return if symptoms worsen or fail to improve, for cath follow up 1-2 weeks.    Diagnoses and all orders for this visit:    1. Acute HFrEF (heart failure with reduced ejection fraction) (Primary)  -     [facility] Cath; Future  -     Comprehensive Metabolic Panel; Future  -     CBC & Differential; Future  -     spironolactone (ALDACTONE) 25 MG tablet; Take 1 tablet by mouth Daily.  Dispense: 30 tablet; Refill: 6  -     [facility] Wearable Cardioverter-Defibrillator    2. Essential hypertension  -     [facility] Cath; Future  -     Comprehensive Metabolic Panel; Future  -     CBC & Differential; Future  -     [facility] Wearable Cardioverter-Defibrillator    3. Dyslipidemia  -     [facility] Cath; Future  -     Comprehensive Metabolic Panel; Future  -     CBC & Differential; Future  -     [facility] Wearable Cardioverter-Defibrillator    4. Bilateral carotid artery stenosis  -     [facility] Cath; Future  -     Comprehensive Metabolic Panel; Future  -     CBC & Differential; Future  -     [facility] Wearable Cardioverter-Defibrillator    5. Frequent PVCs  -     [facility] Cath; Future  -     Comprehensive Metabolic Panel; Future  -     CBC & Differential; Future  -     [facility] Wearable Cardioverter-Defibrillator    6. Coronary artery disease involving native coronary artery of native heart without angina  pectoris  -     [facility]; Future  -     Comprehensive Metabolic Panel; Future  -     CBC & Differential; Future  -     [facility] Wearable Cardioverter-Defibrillator        Floyd Villarreal  reports that he has been smoking cigarettes. He started smoking about 40 years ago. He has a 35 pack-year smoking history. He has never used smokeless tobacco. I have educated him on the risk of diseases from using tobacco products such as cancer, COPD, and heart disease.     I advised him to quit and he is not willing to quit.                         MEDS ORDERED DURING VISIT:  New Medications Ordered This Visit   Medications    spironolactone (ALDACTONE) 25 MG tablet     Sig: Take 1 tablet by mouth Daily.     Dispense:  30 tablet     Refill:  6           This document has been electronically signed by Conrado Bishop Jr., APRN  March 20, 2025 12:53 EDT

## 2025-03-20 ENCOUNTER — TELEPHONE (OUTPATIENT)
Dept: CARDIOLOGY | Facility: CLINIC | Age: 65
End: 2025-03-20
Payer: COMMERCIAL

## 2025-03-20 NOTE — TELEPHONE ENCOUNTER
Relay  I called patient and left a message for him to return call. I have [facility] paperwork that needs to be completed with a note that it is for his daughter to be his caregiver. I do not have any information or even a name for his daughter. I did ask that he call back to get her information as well as how many days he is needing covered.

## 2025-03-20 NOTE — TELEPHONE ENCOUNTER
I spoke with Ms. Jocelyn Jolly in regards to Mr. Andi Villarreal as far as the plan of treatment.  Maximizing heart failure medications moving forth with left heart catheterization and potential ICD

## 2025-03-25 DIAGNOSIS — I65.23 BILATERAL CAROTID ARTERY STENOSIS: ICD-10-CM

## 2025-03-25 DIAGNOSIS — E78.5 DYSLIPIDEMIA: ICD-10-CM

## 2025-03-25 DIAGNOSIS — I49.3 FREQUENT PVCS: ICD-10-CM

## 2025-03-25 DIAGNOSIS — I10 ESSENTIAL HYPERTENSION: ICD-10-CM

## 2025-03-25 DIAGNOSIS — I25.10 CORONARY ARTERY DISEASE INVOLVING NATIVE CORONARY ARTERY OF NATIVE HEART WITHOUT ANGINA PECTORIS: ICD-10-CM

## 2025-03-25 DIAGNOSIS — I50.21 ACUTE HFREF (HEART FAILURE WITH REDUCED EJECTION FRACTION): ICD-10-CM

## 2025-04-01 DIAGNOSIS — I10 ESSENTIAL HYPERTENSION: ICD-10-CM

## 2025-04-01 DIAGNOSIS — I49.3 FREQUENT PVCS: ICD-10-CM

## 2025-04-03 ENCOUNTER — OFFICE VISIT (OUTPATIENT)
Dept: CARDIOLOGY | Facility: CLINIC | Age: 65
End: 2025-04-03
Payer: MEDICAID

## 2025-04-03 VITALS
HEART RATE: 105 BPM | WEIGHT: 194 LBS | BODY MASS INDEX: 27.16 KG/M2 | DIASTOLIC BLOOD PRESSURE: 65 MMHG | HEIGHT: 71 IN | SYSTOLIC BLOOD PRESSURE: 109 MMHG | OXYGEN SATURATION: 100 %

## 2025-04-03 DIAGNOSIS — E78.5 DYSLIPIDEMIA: ICD-10-CM

## 2025-04-03 DIAGNOSIS — I10 ESSENTIAL HYPERTENSION: ICD-10-CM

## 2025-04-03 DIAGNOSIS — I50.21 ACUTE HFREF (HEART FAILURE WITH REDUCED EJECTION FRACTION): Primary | ICD-10-CM

## 2025-04-03 DIAGNOSIS — I25.10 CORONARY ARTERY DISEASE INVOLVING NATIVE CORONARY ARTERY OF NATIVE HEART WITHOUT ANGINA PECTORIS: ICD-10-CM

## 2025-04-03 RX ORDER — METOPROLOL SUCCINATE 25 MG/1
25 TABLET, EXTENDED RELEASE ORAL DAILY
Qty: 30 TABLET | Refills: 11 | Status: SHIPPED | OUTPATIENT
Start: 2025-04-03

## 2025-04-03 RX ORDER — METOPROLOL SUCCINATE 100 MG/1
100 TABLET, EXTENDED RELEASE ORAL DAILY
Qty: 30 TABLET | Refills: 5 | Status: SHIPPED | OUTPATIENT
Start: 2025-04-03

## 2025-04-03 NOTE — PROGRESS NOTES
Subjective     Floyd Villarreal is a 65 y.o. male who presents to [facility] for cath follow up  (3/24/25 [facility] / Patient is wearing a life vest .).    CHIEF COMPLIANT  Chief Complaint   Patient presents with    cath follow up      3/24/25 [facility] / Patient is wearing a life vest .       Active Problems:  Problem List Items Addressed This Visit          Cardiac and Vasculature    Coronary artery disease involving native coronary artery of native heart without angina pectoris    Relevant Medications    metoprolol succinate XL (TOPROL-XL) 25 MG 24 hr tablet    Other Relevant Orders    Adult Transthoracic Echo Limited W/ Cont if Necessary Per Protocol    Essential hypertension    Relevant Medications    metoprolol succinate XL (TOPROL-XL) 25 MG 24 hr tablet    Other Relevant Orders    Adult Transthoracic Echo Limited W/ Cont if Necessary Per Protocol     Other Visit Diagnoses         Acute HFrEF (heart failure with reduced ejection fraction)    -  Primary    Relevant Medications    metoprolol succinate XL (TOPROL-XL) 25 MG 24 hr tablet    Other Relevant Orders    Adult Transthoracic Echo Limited W/ Cont if Necessary Per Protocol      Dyslipidemia        Relevant Orders    Adult Transthoracic Echo Limited W/ Cont if Necessary Per Protocol        Active Problems:  1.  Coronary artery disease  1.1 cardiac catheterization February 2018 at hospital in [facility] with 100% occlusion of the proximal RCA status post thrombectomy and stenting x3 nonobstructive LAD and circumflex  1.2 cardiac catheterization June 2019 because of NSTEMI demonstrated high-grade circumflex disease status post stenting of the native circumflex and OM1 with nonobstructive disease otherwise medical management recommended  1.3 left heart cath 3/25: Left main normal, LAD 20%, diagonal 1 40%, diagonal 2  20 to 30%, circumflex stent occluded, RCA heavily stented but patent, EF 30%  2.  Preserved  systolic function  3.  Hypertension  3.1 echocardiogram 2/25: EF 25 to 30%, grade 2 diastolic dysfunction, trivial MR  4.  Dyslipidemia  5.  Diabetes mellitus  6.  Carotid artery stenosis  6.1 nonobstructive carotids by duplex in 2019.  Less than 50% stenosis bilaterally  6.2 carotid duplex 7/20: Nonobstructive carotid artery disease.    HPI  HPI  Floyd Villarreal is a 64-year-old male patient for who presents for cath follow-up, heart failure with reduced ejection fraction, coronary artery disease, and chronic arterial hypertension.    Patient did go under left heart catheterization with Heri Lopez.  The findings indicated a normal left main, 10 to 20% diffuse LAD, diagonal 1 had 40%, diagonal 2 had 20 to 30%, the circumflex stent after OM1 was 100% occluded with faint collateral filling for the distal vessel.  RCA had extensive stenting from the proximal to distal vessel widely patent.  He had global hypokinesis with an EF of 30%.  Medical management was recommended.  We did discuss this in detail.  Patient denies any complications at the right radial access site such as numbness or tingling in his hands.    Patient does have chronic arterial hypertension and which she is on metoprolol, Entresto, spironolactone and isosorbide.  Today's blood pressure is 109/65 heart rate of 105.    Patient does have a history of heart failure with reduced ejection fraction which he is on Entresto, and metoprolol succinate, Jardiance, and spironolactone.  Most recently per left heart catheterization was identified that his ejection fraction was 30% with global hypokinesis.  Previously was 50 to 55% and both 2018 and 2019.  He does have a LifeVest in place.    Patient denies any chest pain, shortness of breath, palpitations, lower extremity edema, fatigue, syncope, PND, orthopnea, or strokelike symptoms.  PRIOR MEDS  Current Outpatient Medications on File Prior to Visit   Medication Sig Dispense Refill    amitriptyline (ELAVIL)  25 MG tablet Take 1 tablet by mouth Every Night.      aspirin 81 MG EC tablet Take 1 tablet by mouth Daily.      empagliflozin (JARDIANCE) 25 MG tablet tablet Take 1 tablet by mouth Daily.      glimepiride (AMARYL) 4 MG tablet Take 1 tablet by mouth 2 (Two) Times a Day.      isosorbide mononitrate (IMDUR) 30 MG 24 hr tablet Take 1 tablet by mouth Every Morning. 30 tablet 11    Liraglutide (VICTOZA SC) Inject 1.8 mg under the skin into the appropriate area as directed Daily.      loratadine (CLARITIN) 10 MG tablet Take 1 tablet by mouth Daily.      metFORMIN (GLUCOPHAGE) 500 MG tablet Take 1 tablet by mouth 2 (two) times a day. 2 tabs bid      metoprolol succinate XL (TOPROL-XL) 100 MG 24 hr tablet TAKE ONE TABLET BY MOUTH DAILY 30 tablet 5    Ozempic, 2 MG/DOSE, 8 MG/3ML solution pen-injector Inject 2 mg under the skin into the appropriate area as directed 1 (One) Time Per Week.      rosuvastatin (CRESTOR) 20 MG tablet Take 1 tablet by mouth Daily With Breakfast.      sacubitril-valsartan (ENTRESTO) 24-26 MG tablet Take 1 tablet by mouth 2 (Two) Times a Day. Discontinue Losartan 60 tablet 11    spironolactone (ALDACTONE) 25 MG tablet Take 1 tablet by mouth Daily. 30 tablet 6    [DISCONTINUED] metoprolol succinate XL (TOPROL-XL) 100 MG 24 hr tablet take one tablet by mouth once daily 90 tablet 1     No current facility-administered medications on file prior to visit.       ALLERGIES  Patient has no known allergies.    HISTORY  Past Medical History:   Diagnosis Date    Coronary arteriosclerosis after percutaneous transluminal coronary angioplasty (PTCA)     Coronary arteriosclerosis after percutaneous transluminal coronary angioplasty (PTCA)     Coronary artery disease     Diabetes mellitus     Hyperlipidemia     Hypertension     Myocardial infarction     MI 6/2019     Stroke        Social History     Socioeconomic History    Marital status:    Tobacco Use    Smoking status: Every Day     Current packs/day:  "0.00     Average packs/day: 1 pack/day for 35.0 years (35.0 ttl pk-yrs)     Types: Cigarettes     Start date: 1984     Last attempt to quit: 2019     Years since quittin.7    Smokeless tobacco: Never   Substance and Sexual Activity    Alcohol use: No    Drug use: No    Sexual activity: Defer       Family History   Problem Relation Age of Onset    Hypertension Mother     Lung cancer Mother     Hypertension Father     Cancer Father         bladder    Bone cancer Father     Cancer Brother         bladder       Review of Systems   Constitutional:  Negative for chills, fatigue and fever.   HENT:  Positive for rhinorrhea. Negative for congestion and sore throat.    Eyes:  Negative for visual disturbance.   Respiratory:  Negative for apnea, chest tightness and shortness of breath.    Cardiovascular:  Negative for chest pain, palpitations and leg swelling.   Gastrointestinal:  Negative for constipation, diarrhea and nausea.   Musculoskeletal:  Positive for back pain. Negative for arthralgias and neck pain.   Skin:  Negative for rash and wound.   Allergic/Immunologic: Negative for environmental allergies and food allergies.   Neurological:  Negative for dizziness, syncope, weakness and light-headedness.   Hematological:  Does not bruise/bleed easily.   Psychiatric/Behavioral:  Negative for sleep disturbance.        Objective     VITALS: /65 (BP Location: Right arm, Patient Position: Sitting, Cuff Size: Adult)   Pulse 105   Ht 180 cm (70.87\")   Wt 88 kg (194 lb)   SpO2 100%   BMI 27.16 kg/m²     LABS:   Lab Results (most recent)       None            IMAGING:   No Images in the past 120 days found..    EXAM:  Physical Exam  Vitals and nursing note reviewed.   Constitutional:       Appearance: He is well-developed.   HENT:      Head: Normocephalic.   Neck:      Thyroid: No thyroid mass.      Vascular: Carotid bruit (Bruit) present. No JVD.      Trachea: Trachea and phonation normal.   Cardiovascular: "      Rate and Rhythm: Normal rate and regular rhythm.      Pulses:           Radial pulses are 2+ on the right side and 2+ on the left side.      Heart sounds: Normal heart sounds. No murmur heard.     No friction rub. No gallop.   Pulmonary:      Effort: Pulmonary effort is normal. No respiratory distress.      Breath sounds: Normal breath sounds. No wheezing or rales.   Musculoskeletal:         General: No swelling. Normal range of motion.      Cervical back: Neck supple.   Skin:     General: Skin is warm and dry.      Capillary Refill: Capillary refill takes less than 2 seconds.      Findings: No rash.   Neurological:      Mental Status: He is alert and oriented to person, place, and time.   Psychiatric:         Speech: Speech normal.         Behavior: Behavior normal.         Thought Content: Thought content normal.         Judgment: Judgment normal.         Procedure   Procedures       Assessment & Plan    Diagnosis Plan   1. Acute HFrEF (heart failure with reduced ejection fraction)  Adult Transthoracic Echo Limited W/ Cont if Necessary Per Protocol      2. Coronary artery disease involving native coronary artery of native heart without angina pectoris  Adult Transthoracic Echo Limited W/ Cont if Necessary Per Protocol      3. Essential hypertension  Adult Transthoracic Echo Limited W/ Cont if Necessary Per Protocol      4. Dyslipidemia  Adult Transthoracic Echo Limited W/ Cont if Necessary Per Protocol      1.  Patient does have heart failure with reduced ejection fraction most recently 30% per left heart catheterization.  He did not have any significant coronary artery disease that was felt to be contributing to his low ejection fraction.  He does have a chronic  of his circumflex with collateral filling.  This was not intervened on.  He will continue wearing the LifeVest as well as guideline driven medication therapy of metoprolol succinate, Entresto, spironolactone, and Jardiance.  We will repeat that  echocardiogram at the 3-month period for reevaluation of his ejection fraction determine for implantable AICD.  2.  Patient's blood pressure is controlled on current blood pressure medication regimen.  Due to tachycardia we will increase his metoprolol to 125 mg daily.  Patient advised to monitor blood pressure on a daily basis and report any persistent highs or lows.  Set goal blood pressure for patient at 130/80 or below.  3.  Overall patient denies any angina anginal symptoms.  Will continue to monitor.  4.  Informed of signs and symptoms of ACS and advised to seek emergent treatment for any new worsening symptoms.  Patient also advised sooner follow-up as needed.  Also advised to follow-up with family doctor as needed  This note is dictated utilizing voice recognition software.  Although this record has been proof read, transcriptional errors may still be present. If questions occur regarding the content of this record please do not hesitate to call our office.  I have reviewed and confirmed the accuracy of the ROS as documented by the MA/DANNIE/LAWSON Caceres    No follow-ups on file.    Diagnoses and all orders for this visit:    1. Acute HFrEF (heart failure with reduced ejection fraction) (Primary)  -     Adult Transthoracic Echo Limited W/ Cont if Necessary Per Protocol; Future    2. Coronary artery disease involving native coronary artery of native heart without angina pectoris  -     Adult Transthoracic Echo Limited W/ Cont if Necessary Per Protocol; Future    3. Essential hypertension  -     Adult Transthoracic Echo Limited W/ Cont if Necessary Per Protocol; Future    4. Dyslipidemia  -     Adult Transthoracic Echo Limited W/ Cont if Necessary Per Protocol; Future    Other orders  -     metoprolol succinate XL (TOPROL-XL) 25 MG 24 hr tablet; Take 1 tablet by mouth Daily.  Dispense: 30 tablet; Refill: 11        Floyd Villarreal  reports that he has been smoking cigarettes. He started smoking about 40  years ago. He has a 35 pack-year smoking history. He has never used smokeless tobacco. I have educated him on the risk of diseases from using tobacco products such as cancer, COPD, and heart disease.     I advised him to quit and he is willing to quit.   I spent 5.5 minutes counseling the patient.           DO YOU VAPE OR USE SMOKELESS TOBACCO PRODUCTS?NO                MEDS ORDERED DURING VISIT:  New Medications Ordered This Visit   Medications    metoprolol succinate XL (TOPROL-XL) 25 MG 24 hr tablet     Sig: Take 1 tablet by mouth Daily.     Dispense:  30 tablet     Refill:  11           This document has been electronically signed by Conrado Bishop Jr., APRN  April 3, 2025 12:50 EDT

## 2025-06-19 ENCOUNTER — TELEPHONE (OUTPATIENT)
Dept: CARDIOLOGY | Facility: CLINIC | Age: 65
End: 2025-06-19
Payer: MEDICAID

## 2025-06-19 NOTE — TELEPHONE ENCOUNTER
Caller: Floyd Villarreal    Relationship: Self    Best call back number: [phone]    What is the best time to reach you: ANY    Who are you requesting to speak with (clinical staff, provider,  specific staff member): CLINICAL    Do you know the name of the person who called: NOT SURE    What was the call regarding: PATIENT IS RETURNING A MISSED CALL. NO NOTE IN CHART. IF LAWSON ESPARZA'S OFFICE IS TRYING TO REACH PATIENT CALL BACK.    Is it okay if the provider responds through MyChart: CALL

## 2025-07-07 ENCOUNTER — HOSPITAL ENCOUNTER (OUTPATIENT)
Dept: CARDIOLOGY | Facility: HOSPITAL | Age: 65
Discharge: HOME | End: 2025-07-07
Admitting: NURSE PRACTITIONER
Payer: MEDICAID

## 2025-07-07 DIAGNOSIS — I50.21 ACUTE HFREF (HEART FAILURE WITH REDUCED EJECTION FRACTION): ICD-10-CM

## 2025-07-07 DIAGNOSIS — E78.5 DYSLIPIDEMIA: ICD-10-CM

## 2025-07-07 DIAGNOSIS — I25.10 CORONARY ARTERY DISEASE INVOLVING NATIVE CORONARY ARTERY OF NATIVE HEART WITHOUT ANGINA PECTORIS: ICD-10-CM

## 2025-07-07 DIAGNOSIS — I10 ESSENTIAL HYPERTENSION: ICD-10-CM

## 2025-07-07 PROCEDURE — 93308 TTE F-UP OR LMTD: CPT

## 2025-07-07 PROCEDURE — 93356 MYOCRD STRAIN IMG SPCKL TRCK: CPT

## 2025-07-10 LAB
BH CV ECHO LEFT VENTRICLE GLOBAL LONGITUDINAL STRAIN: -6.8 %
BH CV ECHO MEAS - ACS: 2.17 CM
BH CV ECHO MEAS - AO ROOT DIAM: 3.6 CM
BH CV ECHO MEAS - EDV(CUBED): 78.2 ML
BH CV ECHO MEAS - EDV(MOD-SP2): 140 ML
BH CV ECHO MEAS - EDV(MOD-SP4): 118 ML
BH CV ECHO MEAS - EF(MOD-SP2): 32 %
BH CV ECHO MEAS - EF(MOD-SP4): 43.2 %
BH CV ECHO MEAS - ESV(CUBED): 44.2 ML
BH CV ECHO MEAS - ESV(MOD-SP2): 95.2 ML
BH CV ECHO MEAS - ESV(MOD-SP4): 67 ML
BH CV ECHO MEAS - FS: 17.3 %
BH CV ECHO MEAS - IVS/LVPW: 1.2 CM
BH CV ECHO MEAS - IVSD: 1.79 CM
BH CV ECHO MEAS - LA DIMENSION: 3.6 CM
BH CV ECHO MEAS - LV DIASTOLIC VOL/BSA (35-75): 57.3 CM2
BH CV ECHO MEAS - LV MASS(C)D: 294 GRAMS
BH CV ECHO MEAS - LV SYSTOLIC VOL/BSA (12-30): 32.5 CM2
BH CV ECHO MEAS - LVIDD: 4.3 CM
BH CV ECHO MEAS - LVIDS: 3.5 CM
BH CV ECHO MEAS - LVPWD: 1.49 CM
BH CV ECHO MEAS - RVDD: 2.23 CM
BH CV ECHO MEAS - SV(MOD-SP2): 44.8 ML
BH CV ECHO MEAS - SV(MOD-SP4): 51 ML
BH CV ECHO MEAS - SVI(MOD-SP2): 21.7 ML/M2
BH CV ECHO MEAS - SVI(MOD-SP4): 24.8 ML/M2
LEFT ATRIUM VOLUME INDEX: 14.8 ML/M2
LV EF 3D SEGMENTATION: 36 %
LV EF BIPLANE MOD: 34.6 %

## 2025-07-14 ENCOUNTER — RESULTS FOLLOW-UP (OUTPATIENT)
Dept: CARDIOLOGY | Facility: CLINIC | Age: 65
End: 2025-07-14
Payer: MEDICAID

## 2025-07-14 NOTE — TELEPHONE ENCOUNTER
Conrado Bishop, LAWSON to Maira Lee MA      7/14/25  1:15 PM  Note      Ejection fraction seems to improved to 35 to 40%.  Keep follow-up with Dina on 7/21/2025         Adult Transthoracic Echo Limited W/ Cont if Necessary Per Protocol        Called and informed pt of the above, he verbalized understanding.

## 2025-07-21 ENCOUNTER — OFFICE VISIT (OUTPATIENT)
Dept: CARDIOLOGY | Facility: CLINIC | Age: 65
End: 2025-07-21
Payer: COMMERCIAL

## 2025-07-21 VITALS
HEART RATE: 88 BPM | OXYGEN SATURATION: 98 % | DIASTOLIC BLOOD PRESSURE: 66 MMHG | HEIGHT: 71 IN | SYSTOLIC BLOOD PRESSURE: 108 MMHG | WEIGHT: 186.4 LBS | BODY MASS INDEX: 26.1 KG/M2

## 2025-07-21 DIAGNOSIS — I25.10 CORONARY ARTERY DISEASE INVOLVING NATIVE CORONARY ARTERY OF NATIVE HEART WITHOUT ANGINA PECTORIS: ICD-10-CM

## 2025-07-21 DIAGNOSIS — I50.21 ACUTE HFREF (HEART FAILURE WITH REDUCED EJECTION FRACTION): Primary | ICD-10-CM

## 2025-07-21 DIAGNOSIS — I49.3 FREQUENT PVCS: ICD-10-CM

## 2025-07-21 DIAGNOSIS — I65.23 BILATERAL CAROTID ARTERY STENOSIS: ICD-10-CM

## 2025-07-21 DIAGNOSIS — I10 ESSENTIAL HYPERTENSION: ICD-10-CM

## 2025-07-21 PROCEDURE — 99214 OFFICE O/P EST MOD 30 MIN: CPT | Performed by: CLINICAL NURSE SPECIALIST

## 2025-07-21 PROCEDURE — 3074F SYST BP LT 130 MM HG: CPT | Performed by: CLINICAL NURSE SPECIALIST

## 2025-07-21 PROCEDURE — 3078F DIAST BP <80 MM HG: CPT | Performed by: CLINICAL NURSE SPECIALIST

## 2025-07-21 NOTE — PROGRESS NOTES
Subjective     Floyd Villarreal is a 65 y.o. male who presents today for Follow-up (Cardiac Management: /No chest pain, no palpitations, no dizziness. Pt states been doing well since last visit. //Would like to ask about handicap placard.), Med Refill (On Aspirin Daily 81 mg//Patient did not bring med list or medicine bottles to appointment, med list has been reviewed and updated based on patient's knowledge of their meds. /), and Labs Only (Last Labs 3/2025).    CHIEF COMPLIANT  Chief Complaint   Patient presents with    Follow-up     Cardiac Management:   No chest pain, no palpitations, no dizziness. Pt states been doing well since last visit.     Would like to ask about handicap placard.    Med Refill     On Aspirin Daily 81 mg    Patient did not bring med list or medicine bottles to appointment, med list has been reviewed and updated based on patient's knowledge of their meds.       Labs Only     Last Labs 3/2025       Active Problems:  Active Problems:  1.  Coronary artery disease  1.1 cardiac catheterization February 2018 at hospital in [facility] with 100% occlusion of the proximal RCA status post thrombectomy and stenting x3 nonobstructive LAD and circumflex  1.2 cardiac catheterization June 2019 because of NSTEMI demonstrated high-grade circumflex disease status post stenting of the native circumflex and OM1 with nonobstructive disease otherwise medical management recommended  1.3 left heart cath 3/25: Left main normal, LAD 20%, diagonal 1 40%, diagonal 2  20 to 30%, circumflex stent occluded, RCA heavily stented but patent, EF 30%  2.  Preserved systolic function  3.  Hypertension  3.1 echocardiogram 2/25: EF 25 to 30%, grade 2 diastolic dysfunction, trivial MR  3.2 Limited echocardiogram 7/7/2025: EF 35 to 40%  4.  Dyslipidemia  5.  Diabetes mellitus  6.  Carotid artery stenosis  6.1 nonobstructive carotids by duplex in 2019.  Less than 50% stenosis bilaterally  6.2 carotid duplex 7/20:  Nonobstructive carotid artery disease.    HPI  The patient is a 65-year-old male that returns for follow-up.  He had a limited echo repeated to reevaluate LV function on 7/7/2025 and his EF has improved to 35 to 40%.  He remains on appropriate guideline directed medical therapy for heart failure management including Toprol, Entresto, Jardiance and spironolactone.  The patient denies chest pain, worsening dyspnea, syncope, near syncope or palpitations.  He states he has been able to tell a difference in his symptoms since being on Entresto.  He does state he will have some dyspnea if he pushes himself but overall is feeling much better.  He is going to be following with a neurosurgeon regarding a pinched nerve in his neck and does anticipate that he will likely need surgery in the future.  His heart rate and blood pressure is stable.  He denies syncope or near syncope.  He has not been wearing the LifeVest and we can now send it back.    PRIOR MEDS  Current Outpatient Medications on File Prior to Visit   Medication Sig Dispense Refill    amitriptyline (ELAVIL) 25 MG tablet Take 1 tablet by mouth Every Night.      aspirin 81 MG EC tablet Take 1 tablet by mouth Daily.      empagliflozin (JARDIANCE) 25 MG tablet tablet Take 1 tablet by mouth Daily.      glimepiride (AMARYL) 4 MG tablet Take 1 tablet by mouth 2 (Two) Times a Day.      isosorbide mononitrate (IMDUR) 30 MG 24 hr tablet Take 1 tablet by mouth Every Morning. 30 tablet 11    loratadine (CLARITIN) 10 MG tablet Take 1 tablet by mouth Daily.      metFORMIN (GLUCOPHAGE) 500 MG tablet Take 1 tablet by mouth 2 (two) times a day. 2 tabs bid      metoprolol succinate XL (TOPROL-XL) 100 MG 24 hr tablet TAKE ONE TABLET BY MOUTH DAILY 30 tablet 5    metoprolol succinate XL (TOPROL-XL) 25 MG 24 hr tablet Take 1 tablet by mouth Daily. 30 tablet 11    Ozempic, 2 MG/DOSE, 8 MG/3ML solution pen-injector Inject 2 mg under the skin into the appropriate area as directed 1 (One)  "Time Per Week.      rosuvastatin (CRESTOR) 20 MG tablet Take 1 tablet by mouth Daily With Breakfast.      sacubitril-valsartan (ENTRESTO) 24-26 MG tablet Take 1 tablet by mouth 2 (Two) Times a Day. Discontinue Losartan 60 tablet 11    spironolactone (ALDACTONE) 25 MG tablet Take 1 tablet by mouth Daily. 30 tablet 6    [DISCONTINUED] Liraglutide (VICTOZA SC) Inject 1.8 mg under the skin into the appropriate area as directed Daily.       No current facility-administered medications on file prior to visit.       ALLERGIES  Patient has no known allergies.    HISTORY  Past Medical History:   Diagnosis Date    Coronary arteriosclerosis after percutaneous transluminal coronary angioplasty (PTCA)     Coronary arteriosclerosis after percutaneous transluminal coronary angioplasty (PTCA)     Coronary artery disease     Diabetes mellitus     Hyperlipidemia     Hypertension     Myocardial infarction     MI 2019     Stroke        Social History     Socioeconomic History    Marital status:    Tobacco Use    Smoking status: Every Day     Current packs/day: 0.00     Average packs/day: 1 pack/day for 35.0 years (35.0 ttl pk-yrs)     Types: Cigarettes     Start date: 1984     Last attempt to quit: 2019     Years since quittin.0    Smokeless tobacco: Never   Vaping Use    Vaping status: Never Used   Substance and Sexual Activity    Alcohol use: No    Drug use: No    Sexual activity: Defer       Family History   Problem Relation Age of Onset    Hypertension Mother     Lung cancer Mother     Hypertension Father     Cancer Father         bladder    Bone cancer Father     Cancer Brother         bladder       Review of Systems   Musculoskeletal:  Positive for back pain and neck pain (pinched nerve).   Neurological:  Positive for numbness (left side due to pinched nerve).       Objective     VITALS: /66 (BP Location: Right arm, Patient Position: Sitting, Cuff Size: Adult)   Pulse 88   Ht 180 cm (70.87\")   Wt " 84.6 kg (186 lb 6.4 oz)   SpO2 98%   BMI 26.09 kg/m²     LABS:   Lab Results (most recent)       None            IMAGING:   No Images in the past 120 days found..    EXAM:  Physical Exam  Constitutional:       Appearance: Normal appearance.   Eyes:      Pupils: Pupils are equal, round, and reactive to light.   Cardiovascular:      Rate and Rhythm: Normal rate and regular rhythm.      Pulses:           Carotid pulses are 2+ on the right side with bruit and 2+ on the left side with bruit.       Radial pulses are 2+ on the right side and 2+ on the left side.        Dorsalis pedis pulses are 2+ on the right side and 2+ on the left side.        Posterior tibial pulses are 2+ on the right side and 2+ on the left side.      Heart sounds: Normal heart sounds.   Pulmonary:      Effort: Pulmonary effort is normal.      Breath sounds: Normal breath sounds.   Abdominal:      General: Bowel sounds are normal.      Palpations: Abdomen is soft.   Musculoskeletal:      Right lower leg: No edema.      Left lower leg: No edema.   Skin:     General: Skin is warm and dry.      Capillary Refill: Capillary refill takes less than 2 seconds.   Neurological:      General: No focal deficit present.      Mental Status: He is alert and oriented to person, place, and time.   Psychiatric:         Mood and Affect: Mood normal.         Thought Content: Thought content normal.         Procedure   Procedures       Assessment & Plan    Diagnosis Plan   1. Acute HFrEF (heart failure with reduced ejection fraction)        2. Coronary artery disease involving native coronary artery of native heart without angina pectoris        3. Essential hypertension        4. Bilateral carotid artery stenosis        5. Frequent PVCs          Plan:  1.  HFrEF: EF has improved to 35 to 40%.  Continue guideline directed medical therapy.  Symptoms are stable at this time.  Will discontinue LifeVest.  He was advised to notify our office if he develops any returning  symptoms.  2.  Coronary artery disease: The patient denies chest pain.  Will continue medical management including aspirin, statin, beta-blocker, Entresto, isosorbide.  He was advised to notify our office if symptoms develop.  3.  Essential hypertension: Blood pressure is stable.  Continue current medications.  The patient was instructed to monitor BP at home and to notify our office if systolic BP is consistently greater than 130-135 systolic.  Goal BP is 130-135/70-80.  4.  Bilateral carotid artery stenosis: Continue aspirin and statin.  5.  Frequent PVCs: The patient is asymptomatic.  Continue beta-blocker.    Return in about 6 months (around 1/21/2026) for Follows with  .    Floyd Villarreal  reports that he has been smoking cigarettes. He started smoking about 41 years ago. He has a 35 pack-year smoking history. He has never used smokeless tobacco.               MEDS ORDERED DURING VISIT:  No orders of the defined types were placed in this encounter.      DISCONTINUED MEDS DURING VISIT:   Medications Discontinued During This Encounter   Medication Reason    Liraglutide (VICTOZA SC) Patient Reported Not Taking          This document has been electronically signed by LAWSON Marie  July 21, 2025 10:40 EDT    Dictated Utilizing Dragon Dictation: Part of this note may be an electronic transcription/translation of spoken language to printed text using the Dragon Dictation System